# Patient Record
Sex: MALE | Race: WHITE | NOT HISPANIC OR LATINO | Employment: FULL TIME | URBAN - METROPOLITAN AREA
[De-identification: names, ages, dates, MRNs, and addresses within clinical notes are randomized per-mention and may not be internally consistent; named-entity substitution may affect disease eponyms.]

---

## 2019-09-11 ENCOUNTER — OFFICE VISIT (OUTPATIENT)
Dept: ENDOCRINOLOGY | Facility: CLINIC | Age: 54
End: 2019-09-11

## 2019-09-11 VITALS
HEART RATE: 78 BPM | WEIGHT: 269 LBS | HEIGHT: 72 IN | DIASTOLIC BLOOD PRESSURE: 80 MMHG | SYSTOLIC BLOOD PRESSURE: 136 MMHG | BODY MASS INDEX: 36.44 KG/M2

## 2019-09-11 DIAGNOSIS — R53.83 FATIGUE, UNSPECIFIED TYPE: ICD-10-CM

## 2019-09-11 DIAGNOSIS — E66.9 CLASS 2 OBESITY WITH BODY MASS INDEX (BMI) OF 36.0 TO 36.9 IN ADULT, UNSPECIFIED OBESITY TYPE, UNSPECIFIED WHETHER SERIOUS COMORBIDITY PRESENT: ICD-10-CM

## 2019-09-11 DIAGNOSIS — E11.65 TYPE 2 DIABETES MELLITUS WITH HYPERGLYCEMIA, WITHOUT LONG-TERM CURRENT USE OF INSULIN (HCC): Primary | ICD-10-CM

## 2019-09-11 DIAGNOSIS — N52.9 ERECTILE DYSFUNCTION, UNSPECIFIED ERECTILE DYSFUNCTION TYPE: ICD-10-CM

## 2019-09-11 DIAGNOSIS — E29.1 HYPOGONADISM IN MALE: ICD-10-CM

## 2019-09-11 PROBLEM — E66.812 CLASS 2 OBESITY WITH BODY MASS INDEX (BMI) OF 36.0 TO 36.9 IN ADULT: Status: ACTIVE | Noted: 2019-09-11

## 2019-09-11 PROCEDURE — 99245 OFF/OP CONSLTJ NEW/EST HI 55: CPT | Performed by: INTERNAL MEDICINE

## 2019-09-11 RX ORDER — DEXTROAMPHETAMINE SACCHARATE, AMPHETAMINE ASPARTATE, DEXTROAMPHETAMINE SULFATE AND AMPHETAMINE SULFATE 7.5; 7.5; 7.5; 7.5 MG/1; MG/1; MG/1; MG/1
30 TABLET ORAL 2 TIMES DAILY
COMMUNITY
End: 2019-09-24 | Stop reason: SDUPTHER

## 2019-09-11 RX ORDER — METOPROLOL SUCCINATE 100 MG/1
100 TABLET, EXTENDED RELEASE ORAL DAILY
Refills: 3 | COMMUNITY
Start: 2019-08-31

## 2019-09-11 RX ORDER — VENLAFAXINE 75 MG/1
75 TABLET ORAL DAILY
COMMUNITY

## 2019-09-11 NOTE — PROGRESS NOTES
Viviana Álvarez 48 y o  male MRN: [id number]    Encounter: [id number]  Referring Provider  [facility]  [address], [address] [place] [address]    Assessment/Plan     Assessment: This is a 48y o -year-old male with history of type 2 diabetes mellitus, obesity, ADHD    Plan:  1  Type 2 diabetes mellitus long-term insulin therapy  Poorly controlled based on recall of A1c, blood sugar log    Recommend the following at this time  Continue to tresiba 35 units subcutaneously once a day  Increase NovoLog to 12 units subcutaneously with meals 3 times a day  Start sliding scale insulin    - check blood sugars qAC and HS  - check A1c, CMP, lipid panel, urine microalbumin  - referred for diabetes education/medical nutrition therapy  Discussed action of long and short-acting insulin, relation to food    Patient has been given a list of long, short-acting insulins to inquire which would be most affordable per insurance  He will find out if he has used jardiance or Januvia in the past     - Recommended a consistent carbohydrate diet   - weight control and exercise as discussed ( ideal is 30 min, at least 5 times a week)   - home glucose monitoring and goals emphasized, requested patient bring in glucose monitor or log sheets to next visit   - discussed signs and symptoms of hypoglycemia and how to correct them appropriately  - counseled about the long term complications of uncontrolled diabetes, including, Nephropathy, Neuropathy, CVD, Retinopathy and importance  of adherence to diet, treatment plan and life style modifications   - importance of following up with Opthalmology and podiatry   - glycohemoglobin and other lab monitoring discussed, A1c goal value reviewed  - long term diabetic complications discussed    2     History of hypogonadism, testosterone therapy  Unclear if this was a confirmed diagnosis, Etiology unknown   -check a m  Total, free testosterone, LH, FSH, prolactin level, CBC, TSH    3  Obesity-diet and lifestyle as discussed, weight loss  4  Fatigue - investigations as above     CC: Diabetes    History of Present Illness     HPI:  Seth Henry is a 48 y o  male presents for a new visit regarding diabetes management  Also has a h/o ADHD, hypogonadism      DM history:   Diagnosed in 1972   No complications of CAD/ CVA/CKD  Last Eye exam:  No retinopathy;  Long time ago     Current regimen:   tresiba 35 units daily  novolog 10 units with meals - does not always take before the meal    Metformin - got a sore back   glimepiride - stopped for unknown reasons; no s/e  [place] or jardiance- " bad one"   ozempic - does not remember but some s/e     Has used Lisinopril, rosuvastatin in the past    Statin:  --   ACE-I/ARB: --     C/o shortness of breath, back ache and back pain   Patient feels that it is from the novolog  When he ran out of it and feels his symptoms are better  Gained weight- 19 lbs   In office A1c approx 6 weeks ago was 11 per patient   C/o dizziness, feels tired all the time   No vertigo but "feels off balance"   paraesthesias in the feet   Check BG multiple times a day   avg : 170-200  Lows approx 1 in 2 weeks   Gets shakes when low   No exercise; Not following a CC diet     Never had diabetes education   Also finding medications expensive     Also was taking testosterone cypionate 100 mg every 2 weeks [facility]  Did not take it regularly but felt better while he was on it  X 3 years   Stopped taking that 3 months ago   Had c/o low sex drive and feeling sleepy   At this time , low libido , C/o difficulty in maintaining erections   Tried viagra - got headaches   No investigations done for etiology   Rare headaches; vision has changed - blurriness that waxes and wanes   No h/o genital or head trauma   No narcotics   No galctorrhea/ Gynecomastia     All other systems were reviewed and are negative      Review of Systems      Historical Information   No past medical history on file  No past surgical history on file  Social History   Social History     Substance and Sexual Activity   Alcohol Use Not on file     Social History     Substance and Sexual Activity   Drug Use Not on file     Social History     Tobacco Use   Smoking Status Not on file     Family History: No family history on file  Meds/Allergies   Current Outpatient Medications   Medication Sig Dispense Refill    amphetamine-dextroamphetamine (ADDERALL) 30 MG tablet Take 30 mg by mouth 2 (two) times a day      Insulin Aspart (NOVOLOG FLEXPEN SC) Inject 10 Units under the skin 3 (three) times a day with meals      Insulin Degludec (TRESIBA) 100 UNIT/ML SOLN Inject 35 Units under the skin daily      metoprolol succinate (TOPROL-XL) 100 mg 24 hr tablet Take 100 mg by mouth 2 (two) times a day  3    venlafaxine (EFFEXOR) 75 mg tablet Take 75 mg by mouth daily       No current facility-administered medications for this visit  No Known Allergies    Objective   Vitals: Blood pressure 136/80, pulse 78, height 6' (1 829 m), weight 122 kg (269 lb)  Physical Exam   Constitutional: He is oriented to person, place, and time  He appears well-developed and well-nourished  No distress  HENT:   Head: Normocephalic and atraumatic  Eyes: Pupils are equal, round, and reactive to light  Conjunctivae are normal    Neck: Normal range of motion  Neck supple  No thyromegaly present  Cardiovascular: Normal rate, regular rhythm and normal heart sounds  No murmur heard  Pulmonary/Chest: Effort normal and breath sounds normal  No respiratory distress  He has no wheezes  Abdominal: Soft  He exhibits no distension  There is no tenderness  There is no guarding  Musculoskeletal: He exhibits no edema  Neurological: He is alert and oriented to person, place, and time  Skin: Skin is warm and dry  No rash noted  He is not diaphoretic  No erythema  Psychiatric: He has a normal mood and affect   His behavior is normal  Thought content normal    Vitals reviewed  The history was obtained from the review of the chart, patient  Lab Results:  No labs      Portions of the record may have been created with voice recognition software  Occasional wrong word or "sound a like" substitutions may have occurred due to the inherent limitations of voice recognition software  Read the chart carefully and recognize, using context, where substitutions have occurred

## 2019-09-11 NOTE — PATIENT INSTRUCTIONS
Please get labs done as ordered  Continue to tresiba 35 units subcutaneously once a day  Increase NovoLog to 12 units subcutaneously with meals 3 times a day  Please take before the meal   If you are not eating or not eating any carbohydrates to not take mealtime insulin  In addition, please use novolog insulin per the following sliding scale to correct high blood sugars:  BG  151-200: 1 unit  201-250: 2 units  251-300: 3 units  301-350: 4 units  > 350: 5 units  Do not correct bed time highs unless > 200 mg/dl     You are being referred for diabetes education     Follow up in 4-6 weeks    Please call and inquire from your insurance which of the following will be covered   1  Lantus, Levemir,toujeo, basaglar, tresiba  2   Novolog, humalog, apidra, admelog    Find out if you have taken [place] or jardiance in the past

## 2019-09-24 DIAGNOSIS — F90.9 ATTENTION DEFICIT HYPERACTIVITY DISORDER (ADHD), UNSPECIFIED ADHD TYPE: Primary | ICD-10-CM

## 2019-09-25 DIAGNOSIS — F90.9 ATTENTION DEFICIT HYPERACTIVITY DISORDER (ADHD), UNSPECIFIED ADHD TYPE: ICD-10-CM

## 2019-09-25 RX ORDER — DEXTROAMPHETAMINE SACCHARATE, AMPHETAMINE ASPARTATE, DEXTROAMPHETAMINE SULFATE AND AMPHETAMINE SULFATE 7.5; 7.5; 7.5; 7.5 MG/1; MG/1; MG/1; MG/1
30 TABLET ORAL 2 TIMES DAILY
Qty: 60 TABLET | Refills: 0 | Status: SHIPPED | OUTPATIENT
Start: 2019-09-25 | End: 2019-09-25 | Stop reason: SDUPTHER

## 2019-09-25 RX ORDER — DEXTROAMPHETAMINE SACCHARATE, AMPHETAMINE ASPARTATE, DEXTROAMPHETAMINE SULFATE AND AMPHETAMINE SULFATE 7.5; 7.5; 7.5; 7.5 MG/1; MG/1; MG/1; MG/1
30 TABLET ORAL 2 TIMES DAILY
Qty: 60 TABLET | Refills: 0 | Status: SHIPPED | OUTPATIENT
Start: 2019-09-25 | End: 2019-10-28 | Stop reason: SDUPTHER

## 2019-09-25 NOTE — TELEPHONE ENCOUNTER
I am not sure if the patient is aware that Elisharasheedmatthew Huynhon is no longer with this, he will need to find a new PCP and in the meantime I will give him this 1 refill at this time, but no further refills

## 2019-10-28 ENCOUNTER — TELEPHONE (OUTPATIENT)
Dept: NEPHROLOGY | Facility: CLINIC | Age: 54
End: 2019-10-28

## 2019-10-28 DIAGNOSIS — F90.9 ATTENTION DEFICIT HYPERACTIVITY DISORDER (ADHD), UNSPECIFIED ADHD TYPE: ICD-10-CM

## 2019-10-28 RX ORDER — DEXTROAMPHETAMINE SACCHARATE, AMPHETAMINE ASPARTATE, DEXTROAMPHETAMINE SULFATE AND AMPHETAMINE SULFATE 7.5; 7.5; 7.5; 7.5 MG/1; MG/1; MG/1; MG/1
30 TABLET ORAL 2 TIMES DAILY
Qty: 60 TABLET | Refills: 0 | Status: SHIPPED | OUTPATIENT
Start: 2019-10-28 | End: 2019-10-28

## 2019-10-28 RX ORDER — DEXTROAMPHETAMINE SACCHARATE, AMPHETAMINE ASPARTATE MONOHYDRATE, DEXTROAMPHETAMINE SULFATE AND AMPHETAMINE SULFATE 7.5; 7.5; 7.5; 7.5 MG/1; MG/1; MG/1; MG/1
30 CAPSULE, EXTENDED RELEASE ORAL EVERY MORNING
Qty: 60 CAPSULE | Refills: 0 | Status: SHIPPED | OUTPATIENT
Start: 2019-10-28 | End: 2019-10-30 | Stop reason: SDUPTHER

## 2019-10-28 NOTE — TELEPHONE ENCOUNTER
Denisa Clark needs a refill on his Adderall 30mg, take 1 tablet by mouth two times daily  It needs to be the extended release, the last script that was written in September was not the extended release  Please print as soon as possible

## 2019-10-29 ENCOUNTER — TELEPHONE (OUTPATIENT)
Dept: NEPHROLOGY | Facility: CLINIC | Age: 54
End: 2019-10-29

## 2019-10-29 NOTE — TELEPHONE ENCOUNTER
Patient called he states that he picked up his script for adderall yesterday and it was written as once a day and it should have been twice daily  He was given a two week supply  So he only needs a 2 week supply sent to the pharmacy

## 2019-10-30 DIAGNOSIS — F90.9 ATTENTION DEFICIT HYPERACTIVITY DISORDER (ADHD), UNSPECIFIED ADHD TYPE: ICD-10-CM

## 2019-10-30 RX ORDER — DEXTROAMPHETAMINE SACCHARATE, AMPHETAMINE ASPARTATE MONOHYDRATE, DEXTROAMPHETAMINE SULFATE AND AMPHETAMINE SULFATE 7.5; 7.5; 7.5; 7.5 MG/1; MG/1; MG/1; MG/1
30 CAPSULE, EXTENDED RELEASE ORAL EVERY MORNING
Qty: 60 CAPSULE | Refills: 0 | Status: SHIPPED | OUTPATIENT
Start: 2019-10-30 | End: 2019-11-01 | Stop reason: SDUPTHER

## 2019-11-01 DIAGNOSIS — F90.9 ATTENTION DEFICIT HYPERACTIVITY DISORDER (ADHD), UNSPECIFIED ADHD TYPE: ICD-10-CM

## 2019-11-01 RX ORDER — DEXTROAMPHETAMINE SACCHARATE, AMPHETAMINE ASPARTATE MONOHYDRATE, DEXTROAMPHETAMINE SULFATE AND AMPHETAMINE SULFATE 7.5; 7.5; 7.5; 7.5 MG/1; MG/1; MG/1; MG/1
30 CAPSULE, EXTENDED RELEASE ORAL 2 TIMES DAILY
Qty: 60 CAPSULE | Refills: 0 | Status: SHIPPED | OUTPATIENT
Start: 2019-11-01 | End: 2019-12-17 | Stop reason: SDUPTHER

## 2019-12-17 ENCOUNTER — OFFICE VISIT (OUTPATIENT)
Dept: URGENT CARE | Facility: CLINIC | Age: 54
End: 2019-12-17
Payer: COMMERCIAL

## 2019-12-17 ENCOUNTER — HOSPITAL ENCOUNTER (EMERGENCY)
Facility: HOSPITAL | Age: 54
Discharge: HOME | End: 2019-12-17
Attending: EMERGENCY MEDICINE
Payer: COMMERCIAL

## 2019-12-17 VITALS
RESPIRATION RATE: 18 BRPM | SYSTOLIC BLOOD PRESSURE: 167 MMHG | TEMPERATURE: 98 F | HEART RATE: 86 BPM | OXYGEN SATURATION: 97 % | WEIGHT: 270 LBS | DIASTOLIC BLOOD PRESSURE: 100 MMHG | BODY MASS INDEX: 36.62 KG/M2

## 2019-12-17 VITALS
DIASTOLIC BLOOD PRESSURE: 105 MMHG | RESPIRATION RATE: 17 BRPM | OXYGEN SATURATION: 98 % | SYSTOLIC BLOOD PRESSURE: 168 MMHG | HEART RATE: 86 BPM | WEIGHT: 270 LBS | TEMPERATURE: 97.8 F | BODY MASS INDEX: 36.62 KG/M2

## 2019-12-17 DIAGNOSIS — S01.81XA FACIAL LACERATION, INITIAL ENCOUNTER: Primary | ICD-10-CM

## 2019-12-17 PROCEDURE — 90471 IMMUNIZATION ADMIN: CPT | Performed by: PHYSICIAN ASSISTANT

## 2019-12-17 PROCEDURE — 90715 TDAP VACCINE 7 YRS/> IM: CPT

## 2019-12-17 PROCEDURE — 12052 INTMD RPR FACE/MM 2.6-5.0 CM: CPT | Performed by: EMERGENCY MEDICINE

## 2019-12-17 PROCEDURE — 99283 EMERGENCY DEPT VISIT LOW MDM: CPT

## 2019-12-17 PROCEDURE — G0382 LEV 3 HOSP TYPE B ED VISIT: HCPCS | Performed by: PHYSICIAN ASSISTANT

## 2019-12-17 PROCEDURE — 99282 EMERGENCY DEPT VISIT SF MDM: CPT | Performed by: EMERGENCY MEDICINE

## 2019-12-17 RX ORDER — LIDOCAINE HYDROCHLORIDE AND EPINEPHRINE 10; 10 MG/ML; UG/ML
5 INJECTION, SOLUTION INFILTRATION; PERINEURAL ONCE
Status: COMPLETED | OUTPATIENT
Start: 2019-12-17 | End: 2019-12-17

## 2019-12-17 RX ORDER — LIDOCAINE HYDROCHLORIDE 10 MG/ML
5 INJECTION, SOLUTION EPIDURAL; INFILTRATION; INTRACAUDAL; PERINEURAL ONCE
Status: COMPLETED | OUTPATIENT
Start: 2019-12-17 | End: 2019-12-17

## 2019-12-17 RX ORDER — INSULIN LISPRO 100 [IU]/ML
INJECTION, SOLUTION INTRAVENOUS; SUBCUTANEOUS
COMMUNITY
Start: 2019-12-09

## 2019-12-17 RX ADMIN — LIDOCAINE HYDROCHLORIDE AND EPINEPHRINE 5 ML: 10; 10 INJECTION, SOLUTION INFILTRATION; PERINEURAL at 17:23

## 2019-12-17 RX ADMIN — LIDOCAINE HYDROCHLORIDE 5 ML: 10 INJECTION, SOLUTION EPIDURAL; INFILTRATION; INTRACAUDAL; PERINEURAL at 17:23

## 2019-12-17 NOTE — PATIENT INSTRUCTIONS
Cannot completely evaluate laceration and see how deep it is  Recommend patient go to the emergency room for repair of facial laceration  He agrees and will be going to [facility]  Tetanus was given here

## 2019-12-17 NOTE — PROGRESS NOTES
[facility]    NAME: Vinny Guidry is a 48 y o  male  : 1965    MRN: [id number]  DATE: 2019  TIME: 4:11 PM    Assessment and Plan   Facial laceration, initial encounter Geeta Graves  Facial laceration, initial encounter  TDAP Vaccine greater than or equal to 8yo    Transfer to other facility       Patient Instructions     Patient Instructions   Cannot completely evaluate laceration and see how deep it is  Recommend patient go to the emergency room for repair of facial laceration  He agrees and will be going to [facility]  Tetanus was given here  Chief Complaint     Chief Complaint   Patient presents with    Laceration     face       History of Present Illness   57-year-old male here with laceration to the left side of his face  Patient states that he was using a  to cut off a lock and the  kicked back and caught him in the left cheek  Injury occurred about 2 hours ago  Review of Systems   Review of Systems   Constitutional: Negative for chills and fever  HENT: Negative for congestion  Respiratory: Negative for cough and shortness of breath  Cardiovascular: Negative for chest pain  Skin: Positive for wound         Current Medications     Current Outpatient Medications:     HUMALOG KWIKPEN 100 units/mL injection pen, , Disp: , Rfl:     Insulin Aspart (NOVOLOG FLEXPEN SC), Inject 10 Units under the skin 3 (three) times a day with meals, Disp: , Rfl:     Insulin Degludec (TRESIBA) 100 UNIT/ML SOLN, Inject 35 Units under the skin daily, Disp: , Rfl:     metoprolol succinate (TOPROL-XL) 100 mg 24 hr tablet, Take 100 mg by mouth 2 (two) times a day, Disp: , Rfl: 3    venlafaxine (EFFEXOR) 75 mg tablet, Take 75 mg by mouth daily, Disp: , Rfl:     Current Allergies     Allergies as of 2019    (No Known Allergies)          The following portions of the patient's history were reviewed and updated as appropriate: allergies, current medications, past family history, past medical history, past social history, past surgical history and problem list    Past Medical History:   Diagnosis Date    Diabetes mellitus ([facility] [address] )     Hypertension      History reviewed  No pertinent surgical history  History reviewed  No pertinent family history  Social History     Socioeconomic History    Marital status: Single     Spouse name: Not on file    Number of children: Not on file    Years of education: Not on file    Highest education level: Not on file   Occupational History    Not on file   Social Needs    Financial resource strain: Not on file    Food insecurity:     Worry: Not on file     Inability: Not on file    Transportation needs:     Medical: Not on file     Non-medical: Not on file   Tobacco Use    Smoking status: Not on file   Substance and Sexual Activity    Alcohol use: Not on file    Drug use: Not on file    Sexual activity: Not on file   Lifestyle    Physical activity:     Days per week: Not on file     Minutes per session: Not on file    Stress: Not on file   Relationships    Social connections:     Talks on phone: Not on file     Gets together: Not on file     Attends Sikhism service: Not on file     Active member of club or organization: Not on file     Attends meetings of clubs or organizations: Not on file     Relationship status: Not on file    Intimate partner violence:     Fear of current or ex partner: Not on file     Emotionally abused: Not on file     Physically abused: Not on file     Forced sexual activity: Not on file   Other Topics Concern    Not on file   Social History Narrative    Not on file     Medications have been verified  Objective   /100   Pulse 86   Temp 98 °F (36 7 °C)   Resp 18   Wt 122 kg (270 lb)   SpO2 97%   BMI 36 62 kg/m²      Physical Exam   Physical Exam   Constitutional: He appears well-developed and well-nourished  No distress     HENT:   Head:       Cardiovascular: Normal rate, regular rhythm, normal heart sounds and intact distal pulses  Pulmonary/Chest: Effort normal and breath sounds normal  No respiratory distress  Nursing note and vitals reviewed

## 2019-12-17 NOTE — ED PROVIDER NOTES
History  Chief Complaint   Patient presents with    Facial Laceration     Patient states  flew out of his hand and cut his face about 2 hours ago  68-year-old male with past medical history of diabetes mellitus on insulin and hypertension who is presenting with a facial laceration  Patient reports that he was using a  in order to open a lock  The  then "flew back" and hit the patient in the face  Patient denies losing consciousness  He noted blood coming from his face so he went to an urgent care  The urgent care referred him to the ER for repair of his laceration  At this time, the patient has minimal pain  He denies any headache, vision changes, numbness, difficulty speaking, or any other complaints at this time  He is able to bite down without difficulty  Patient is up-to-date with tetanus  His last immunization was approximately 1 year ago  Prior to Admission Medications   Prescriptions Last Dose Informant Patient Reported? Taking? HUMALOG KWIKPEN 100 units/mL injection pen   Yes No   Insulin Aspart (NOVOLOG FLEXPEN SC)   Yes No   Sig: Inject 10 Units under the skin 3 (three) times a day with meals   Insulin Degludec (TRESIBA) 100 UNIT/ML SOLN   Yes No   Sig: Inject 35 Units under the skin daily   metoprolol succinate (TOPROL-XL) 100 mg 24 hr tablet   Yes No   Sig: Take 100 mg by mouth 2 (two) times a day   venlafaxine (EFFEXOR) 75 mg tablet   Yes No   Sig: Take 75 mg by mouth daily      Facility-Administered Medications: None       Past Medical History:   Diagnosis Date    Diabetes mellitus ([facility] [address] )     Hypertension        History reviewed  No pertinent surgical history  History reviewed  No pertinent family history  I have reviewed and agree with the history as documented  Social History     Tobacco Use    Smoking status: Never Smoker    Smokeless tobacco: Current User     Types: Chew   Substance Use Topics    Alcohol use:  Yes    Drug use: Never Review of Systems   Constitutional: Negative for diaphoresis, fever and unexpected weight change  HENT: Negative for congestion, rhinorrhea and sore throat  Eyes: Negative for pain, discharge and visual disturbance  Respiratory: Negative for cough, shortness of breath and wheezing  Cardiovascular: Negative for chest pain, palpitations and leg swelling  Gastrointestinal: Negative for abdominal pain, blood in stool, constipation, diarrhea, nausea and vomiting  Genitourinary: Negative for dysuria, flank pain and hematuria  Musculoskeletal: Negative for arthralgias and myalgias  Skin: Positive for wound  Negative for rash  Allergic/Immunologic: Negative for environmental allergies and food allergies  Neurological: Negative for dizziness, seizures, weakness and numbness  Hematological: Negative for adenopathy  Psychiatric/Behavioral: Negative for confusion and hallucinations  Physical Exam  ED Triage Vitals [12/17/19 1702]   Temperature Pulse Respirations Blood Pressure SpO2   97 8 °F (36 6 °C) 86 17 (!) 168/105 98 %      Temp Source Heart Rate Source Patient Position - Orthostatic VS BP Location FiO2 (%)   Oral Monitor Sitting Left arm --      Pain Score       1             Orthostatic Vital Signs  Vitals:    12/17/19 1702   BP: (!) 168/105   Pulse: 86   Patient Position - Orthostatic VS: Sitting       Physical Exam   Constitutional: He is oriented to person, place, and time  He appears well-developed and well-nourished  HENT:   Head: Normocephalic and atraumatic  Right Ear: External ear normal    Left Ear: External ear normal    Nose: Nose normal    Patient is able to bite down without difficulty  Occlusion appears normal   Patient has a dental prosthesis of the mandibular incisors  Eyes: Pupils are equal, round, and reactive to light  EOM are normal    Neck: Normal range of motion  Neck supple  Cardiovascular: Normal rate, regular rhythm and normal heart sounds     No murmur heard   Pulmonary/Chest: Effort normal and breath sounds normal  No respiratory distress  He has no wheezes  He has no rales  Abdominal: Soft  Bowel sounds are normal  He exhibits no distension  There is no tenderness  There is no guarding  Musculoskeletal: Normal range of motion  He exhibits no deformity  Neurological: He is alert and oriented to person, place, and time  Skin: Skin is warm and dry  Capillary refill takes less than 2 seconds  He is not diaphoretic  There is a 4 cm laceration located just inferior and lateral to the left lower lip  Bleeding is controlled  Laceration extends into the subcutaneous layer but does not extend through the oral mucosa  Psychiatric: He has a normal mood and affect  His behavior is normal    Nursing note and vitals reviewed  ED Medications  Medications   lidocaine-epinephrine (XYLOCAINE/EPINEPHRINE) 1 %-1:100,000 injection 5 mL (5 mL Infiltration Given by Other 12/17/19 1723)   lidocaine (PF) (XYLOCAINE-MPF) 1 % injection 5 mL (5 mL Infiltration Given by Other 12/17/19 1723)       Diagnostic Studies  Results Reviewed     None                 No orders to display         Procedures  Nerve block  Date/Time: 12/17/2019 6:05 PM  Performed by: Prince Griffiths MD  Authorized by: Prince Griffiths MD     Patient location:  ED  Other Assisting Provider: No    Consent:     Consent obtained:  Verbal    Consent given by:  Patient    Risks discussed:  Pain, bleeding, infection, intravenous injection, nerve damage and unsuccessful block    Alternatives discussed:  Alternative treatment  Universal protocol:     Procedure explained and questions answered to patient or proxy's satisfaction: yes      Time out called: yes      Patient identity confirmed:  Verbally with patient and arm band  Indications:     Indications:  Procedural anesthesia  Location:     Body area:  Head    Head nerve blocked: Mental nerve      Nerve type:  Peripheral    Laterality: Left  Procedure details (see MAR for exact dosages): Block needle gauge:  27 G    Anesthetic injected:  Lidocaine 1% w/o epi  Post-procedure details:     Dressing:  None    Outcome:  Anesthesia achieved    Patient tolerance of procedure: Tolerated well, no immediate complications    Laceration repair  Date/Time: 12/17/2019 6:53 PM  Performed by: Lary Lindquist MD  Authorized by: Lary Lindquist MD   Consent: Verbal consent obtained  Risks and benefits: risks, benefits and alternatives were discussed  Consent given by: patient  Patient understanding: patient states understanding of the procedure being performed  Patient consent: the patient's understanding of the procedure matches consent given  Site marked: the operative site was marked  Patient identity confirmed: verbally with patient and arm band  Time out: Immediately prior to procedure a "time out" was called to verify the correct patient, procedure, equipment, support staff and site/side marked as required  Body area: head/neck  Location details: left cheek  Laceration length: 4 cm  Foreign bodies: no foreign bodies  Tendon involvement: none  Nerve involvement: none  Vascular damage: no  Anesthesia: local infiltration and nerve block    Anesthesia:  Local Anesthetic: lidocaine 1% with epinephrine  Anesthetic total: 4 mL      Procedure Details:  Preparation: Patient was prepped and draped in the usual sterile fashion  Irrigation solution: saline  Irrigation method: syringe  Amount of cleaning: extensive  Skin closure: 4-0 Prolene  Wound subcutaneous closure material used: 4-0 Monocryl    Number of sutures: 14  Technique: complex  Approximation: close  Approximation difficulty: complex  Patient tolerance: Patient tolerated the procedure well with no immediate complications                 ED Course                               MDM  Number of Diagnoses or Management Options  Facial laceration, initial encounter: new and does not require workup  Diagnosis management comments:     Patient presented with facial laceration as described above  He had no other injuries  Tetanus was up-to-date  Laceration was thoroughly irrigated after mental nerve block and local infiltration  The laceration was repaired as described above  Patient tolerated procedure well  Wound care instructions were given to the patient  Return precautions were also discussed  He verbalized understanding  Amount and/or Complexity of Data Reviewed  Decide to obtain previous medical records or to obtain history from someone other than the patient: yes  Review and summarize past medical records: yes    Risk of Complications, Morbidity, and/or Mortality  Presenting problems: low  Diagnostic procedures: minimal  Management options: minimal    Patient Progress  Patient progress: improved        Disposition  Final diagnoses:   Facial laceration, initial encounter     Time reflects when diagnosis was documented in both MDM as applicable and the Disposition within this note     Time User Action Codes Description Comment    12/17/2019  6:54 PM Alessandra Craig Add [[id number]] Facial laceration, initial encounter       ED Disposition     ED Disposition Condition Date/Time Comment    Discharge Good e Dec 17, 2019  6:54 PM Kendy Proctor discharge to home/self care  Follow-up Information     Follow up With Specialties Details Why Contact Info Additional [address] Emergency Department Emergency Medicine Go to  If symptoms worsen, For suture removal in 5 days   [address] [place] [address], [address] [place] [address], [id number] [phone]          Discharge Medication List as of 12/17/2019  6:56 PM      CONTINUE these medications which have NOT CHANGED    Details   HUMALOG KWIKPEN 100 units/mL injection pen Starting Mon 12/9/2019, Historical Med      Insulin Aspart (NOVOLOG FLEXPEN SC) Inject 10 Units under the skin 3 (three) times a day with meals, Historical Med      Insulin Degludec (TRESIBA) 100 UNIT/ML SOLN Inject 35 Units under the skin daily, Historical Med      metoprolol succinate (TOPROL-XL) 100 mg 24 hr tablet Take 100 mg by mouth 2 (two) times a day, Starting Sat 8/31/2019, Historical Med      venlafaxine (EFFEXOR) 75 mg tablet Take 75 mg by mouth daily, Historical Med           No discharge procedures on file  ED Provider  Attending physically available and evaluated Hanny Cedillo I managed the patient along with the ED Attending      Electronically Signed by         Solitario Moyer MD  12/17/19 1946

## 2019-12-17 NOTE — ED ATTENDING ATTESTATION
12/17/2019  IZeny MD, saw and evaluated the patient  I have discussed the patient with the resident/non-physician practitioner and agree with the resident's/non-physician practitioner's findings, Plan of Care, and MDM as documented in the resident's/non-physician practitioner's note, except where noted  All available labs and Radiology studies were reviewed  I was present for key portions of any procedure(s) performed by the resident/non-physician practitioner and I was immediately available to provide assistance  At this point I agree with the current assessment done in the Emergency Department  I have conducted an independent evaluation of this patient a history and physical is as follows:      Patient presents referred from urgent care with approximate 4 in laceration to the subcutaneous tissue on the inferior lateral side of left face  No intraoral involvement  Patient states he cut it with an angle  at home angle  kicked back and hit him in the face he had immediate bleeding  Patient denies any additional injuries  On physical exam patient has no facial nerve involvement with smile or grimace  Impression:  Complicated facial laceration  Will proceed with primary closure using sutures    Wound care instructions given to patient     ED Course         Critical Care Time  Procedures

## 2019-12-17 NOTE — DISCHARGE INSTRUCTIONS
You may shower tonight  Pat the area dry  Avoid swimming in pools, hot tubs, or lakes  You will need to have the sutures removed in 5 days  This can be done at an ER or urgent care  Your PCP may also have suture removal supplies  Return to the ER or seek treatment if you have fever, chills, increasing redness around the wound, pus coming from the wound, or if the wound comes apart

## 2021-12-27 ENCOUNTER — APPOINTMENT (OUTPATIENT)
Dept: RADIOLOGY | Facility: CLINIC | Age: 56
End: 2021-12-27
Payer: COMMERCIAL

## 2021-12-27 ENCOUNTER — OFFICE VISIT (OUTPATIENT)
Dept: URGENT CARE | Facility: CLINIC | Age: 56
End: 2021-12-27
Payer: COMMERCIAL

## 2021-12-27 VITALS
DIASTOLIC BLOOD PRESSURE: 84 MMHG | HEIGHT: 72 IN | RESPIRATION RATE: 18 BRPM | HEART RATE: 68 BPM | OXYGEN SATURATION: 95 % | SYSTOLIC BLOOD PRESSURE: 130 MMHG | TEMPERATURE: 96.9 F | WEIGHT: 275 LBS | BODY MASS INDEX: 37.25 KG/M2

## 2021-12-27 DIAGNOSIS — R05.9 COUGH: ICD-10-CM

## 2021-12-27 DIAGNOSIS — R06.02 SOB (SHORTNESS OF BREATH): ICD-10-CM

## 2021-12-27 DIAGNOSIS — J06.9 UPPER RESPIRATORY INFECTION WITH COUGH AND CONGESTION: Primary | ICD-10-CM

## 2021-12-27 PROCEDURE — 99213 OFFICE O/P EST LOW 20 MIN: CPT | Performed by: NURSE PRACTITIONER

## 2021-12-27 PROCEDURE — 87636 SARSCOV2 & INF A&B AMP PRB: CPT | Performed by: NURSE PRACTITIONER

## 2021-12-27 PROCEDURE — 71046 X-RAY EXAM CHEST 2 VIEWS: CPT

## 2021-12-27 RX ORDER — ROSUVASTATIN CALCIUM 5 MG/1
5 TABLET, COATED ORAL DAILY
COMMUNITY

## 2021-12-27 RX ORDER — DEXTROAMPHETAMINE SACCHARATE, AMPHETAMINE ASPARTATE, DEXTROAMPHETAMINE SULFATE AND AMPHETAMINE SULFATE 7.5; 7.5; 7.5; 7.5 MG/1; MG/1; MG/1; MG/1
30 TABLET ORAL 2 TIMES DAILY
COMMUNITY

## 2021-12-29 LAB
FLUAV RNA RESP QL NAA+PROBE: POSITIVE
FLUBV RNA RESP QL NAA+PROBE: NEGATIVE
SARS-COV-2 RNA RESP QL NAA+PROBE: POSITIVE

## 2022-01-05 ENCOUNTER — APPOINTMENT (OUTPATIENT)
Dept: RADIOLOGY | Facility: CLINIC | Age: 57
End: 2022-01-05

## 2022-01-05 DIAGNOSIS — R05.9 COUGH: ICD-10-CM

## 2022-01-05 PROCEDURE — 71046 X-RAY EXAM CHEST 2 VIEWS: CPT

## 2022-05-25 ENCOUNTER — OFFICE VISIT (OUTPATIENT)
Dept: ENDOCRINOLOGY | Facility: CLINIC | Age: 57
End: 2022-05-25

## 2022-05-25 VITALS
HEIGHT: 72 IN | WEIGHT: 259.8 LBS | HEART RATE: 70 BPM | DIASTOLIC BLOOD PRESSURE: 75 MMHG | BODY MASS INDEX: 35.19 KG/M2 | SYSTOLIC BLOOD PRESSURE: 130 MMHG

## 2022-05-25 DIAGNOSIS — E11.65 TYPE 2 DIABETES MELLITUS WITH HYPERGLYCEMIA, WITHOUT LONG-TERM CURRENT USE OF INSULIN (HCC): Primary | ICD-10-CM

## 2022-05-25 DIAGNOSIS — Z79.4 CURRENT USE OF INSULIN (HCC): ICD-10-CM

## 2022-05-25 PROCEDURE — 99214 OFFICE O/P EST MOD 30 MIN: CPT | Performed by: INTERNAL MEDICINE

## 2022-05-25 RX ORDER — FLASH GLUCOSE SENSOR
KIT MISCELLANEOUS
Qty: 1 EACH | Refills: 5 | Status: SHIPPED | OUTPATIENT
Start: 2022-05-25

## 2022-05-25 RX ORDER — INSULIN LISPRO 100 [IU]/ML
INJECTION, SOLUTION INTRAVENOUS; SUBCUTANEOUS
Qty: 30 ML | Refills: 4 | Status: SHIPPED | OUTPATIENT
Start: 2022-05-25

## 2022-05-25 RX ORDER — INSULIN GLARGINE 100 [IU]/ML
INJECTION, SOLUTION SUBCUTANEOUS
Qty: 20 ML | Refills: 4 | Status: SHIPPED | OUTPATIENT
Start: 2022-05-25

## 2022-05-25 NOTE — PATIENT INSTRUCTIONS
Please send in blood sugars in 2 weeks  If you get the [place], please call the office to connect to the [facility] can use the Lantus vials 60units once daily, and use the Humalog at the carb counting and correction factor    If you need help with drawing up the insulin syringe, and would like someone to show you how to do it, please let us know       Please send in the labs that you have

## 2022-05-25 NOTE — PROGRESS NOTES
New Patient Progress Note      Chief Complaint   Patient presents with    Diabetes Type 2      Referring Provider  Srikanth Quintanilla Md  [address],  [address]     History of Present Illness:   Amisha Dyson is a 56 y o  male with a history of type 2 diabetes with long term use of insulin  Last seen by Dr Ricky Lou in 2019  Her note is reviewed  Of note, he has no insurance  Formerly, he saw Arnie Deleon, for diabetes care  He states her office is no longer open in his area  He states he felt "horrible" with insulin use including headaches, blurry vision  However, with out insulin he "just wants to sleep" and has poor energy  He had an unintentional 25# weight loss and has noted significant nocturia  Diabetes was diagnosed in 2017  He reports foot/toe numbness, but denies known renal or retinopathy  No complications of MI or CVA  No known hospitalization for DKA or HHS  Last Eye exam:  No retinopathy;  Long time ago   Pod: reports symptoms suggestive of Neuropathy     Current regimen: not used in 3-4mos  tresiba 60 units daily  novolog or Humalog 20 units with meals  He states he uses a carb ratio and correction, 1unit for about 10g of carbs and a correction factor       Prior medications included:  Metformin - got a sore back   glimepiride - stopped for unknown reasons  Januvia or Jardiance- " bad one"   ozempic - some s/e      Has used Lisinopril, rosuvastatin in the past  Does not have BGs  When checks, most are high  Office A1c is too high to calculate    Hypoglycemia denies  DM education: states he knows what to do and what he needs  Reports having some carb counting  Activity: 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, 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          Reports a history of low testosterone with past use of testosterone cypionate 100 mg every 2 weeks (while he was in [place])   Took this for about 3 years, but last took this in 2019   He reports low libido, difficulty in maintaining erections  Saw Urology in dec 2021  Patient Active Problem List   Diagnosis    Type 2 diabetes mellitus with hyperglycemia, without long-term current use of insulin ([facility] )    Hypogonadism in male   Jack Drop Erectile dysfunction    Fatigue    Class 2 obesity with body mass index (BMI) of 36 0 to 36 9 in adult    Current use of insulin (HCC)      Past Medical History:   Diagnosis Date    Diabetes mellitus ([facility] )     Hyperlipemia     Hypertension       History reviewed  No pertinent surgical history  Family History   Problem Relation Age of Onset    Heart failure Mother     Dementia Father      Social History     Tobacco Use    Smoking status: Never Smoker    Smokeless tobacco: Current User     Types: Chew   Substance Use Topics    Alcohol use:  Yes     Allergies   Allergen Reactions    Metformin Diarrhea         Current Outpatient Medications:     amphetamine-dextroamphetamine (ADDERALL) 30 MG tablet, Take 30 mg by mouth 2 (two) times a day, Disp: , Rfl:     Continuous Blood Gluc Sensor (FreeStyle Diomedes 14 Day Sensor) MISC, Use and change every 2 weeks, Disp: 1 each, Rfl: 5    insulin glargine (Lantus) 100 units/mL subcutaneous injection, Inject 60units subcut once daily at 11pm, Disp: 20 mL, Rfl: 4    insulin lispro (HumaLOG KwikPen) 100 units/mL injection pen, Use up to 20units with meals 3x daily based on carb counting and correction, Disp: 30 mL, Rfl: 4    Insulin Syringe-Needle U-100 30G X 5/16" 1 ML MISC, Use daily with glargine vials, Disp: 90 each, Rfl: 1    metoprolol succinate (TOPROL-XL) 100 mg 24 hr tablet, Take 100 mg by mouth daily, Disp: , Rfl: 3    rosuvastatin (CRESTOR) 5 mg tablet, Take 5 mg by mouth daily, Disp: , Rfl:     venlafaxine (EFFEXOR) 75 mg tablet, Take 75 mg by mouth daily, Disp: , Rfl:     HUMALOG KWIKPEN 100 units/mL injection pen, , Disp: , Rfl:     Insulin Aspart (NOVOLOG FLEXPEN SC), Inject 10 Units under the skin 3 (three) times a day with meals (Patient not taking: Reported on 5/25/2022), Disp: , Rfl:     Insulin Degludec 100 UNIT/ML SOLN, Inject 65 Units under the skin daily   (Patient not taking: Reported on 5/25/2022), Disp: , Rfl:   Review of Systems   Constitutional: Positive for unexpected weight change (about 25#)  HENT: Negative for hearing loss and voice change  Eyes: Negative for visual disturbance  Respiratory: Positive for shortness of breath  Negative for wheezing  Gastrointestinal: Negative for constipation, diarrhea, nausea and vomiting  Endocrine: Positive for polydipsia and polyuria  Genitourinary:        Multiple episodes of nocturia   Musculoskeletal: Positive for back pain and myalgias  Neurological: Positive for numbness  Psychiatric/Behavioral: The patient is not nervous/anxious  Physical Exam:  Body mass index is 35 24 kg/m²  /75   Pulse 70   Ht 6' (1 829 m)   Wt 118 kg (259 lb 12 8 oz)   BMI 35 24 kg/m²    Wt Readings from Last 3 Encounters:   05/25/22 118 kg (259 lb 12 8 oz)   12/27/21 125 kg (275 lb)   12/17/19 122 kg (270 lb)       GEN: NAD  E/n/m nl facies, hearing intact bilat, tongue midline, lips nl  Eyes: no stare or proptosis, nl lids and conjunctiva, EOMI  Neck: trachea midline, thyroid NT to palpation, nl in size, no nodules or neck masses noted, no cervical LAD  CV; heart reg rate s1s2 nl, no m/r/g  Resp: CTAB, good effort, no accessory mm use, CTAB  Ab+BS  Neuro: no tremor,   MS: no c/c in digits, moves all 4 ext, nl muscle bulk, gait nl  Skin: warm and dry, no palmar erythema  Psych: nl mood and affect, no gross lapses in memory    Patient's shoes and socks removed  Right Foot/Ankle   Right Foot Inspection  Skin Exam: skin normal, skin intact, callus and callus  No dry skin, no warmth, no erythema, no maceration, no abnormal color, no pre-ulcer and no ulcer  Toe Exam: ROM and strength within normal limits       Sensory   Vibration: diminished  Monofilament testing: intact    Vascular  Capillary refills: < 3 seconds  The right DP pulse is 2+  Left Foot/Ankle  Left Foot Inspection  Skin Exam: skin normal and skin intact  No dry skin, no warmth, no erythema, no maceration, normal color, no pre-ulcer, no ulcer and no callus  Toe Exam: ROM and strength within normal limits  Sensory   Vibration: diminished  Monofilament testing: intact    Vascular  Capillary refills: < 3 seconds  The left DP pulse is 2+  Assign Risk Category  No deformity present  No loss of protective sensation  No weak pulses  Risk: 0      Labs: Impression:  1  Type 2 diabetes mellitus with hyperglycemia, without long-term current use of insulin ([facility] [address] )    2  Current use of insulin ([facility] )           Plan:    Alessandro Hartley was seen today for diabetes type 2  Diagnoses and all orders for this visit:    Type 2 diabetes mellitus with hyperglycemia, without long-term current use of insulin ([facility])  -     Cancel: POCT hemoglobin A1c  -     Continuous Blood Gluc Sensor (FreeStyle Diomedes 14 Day Sensor) MISC; Use and change every 2 weeks  -     insulin glargine (Lantus) 100 units/mL subcutaneous injection; Inject 60units subcut once daily at 11pm  -     insulin lispro (HumaLOG KwikPen) 100 units/mL injection pen; Use up to 20units with meals 3x daily based on carb counting and correction  -     Insulin Syringe-Needle U-100 30G X 5/16" 1 ML MISC; Use daily with glargine vials    Current use of insulin ([facility])  -     Continuous Blood Gluc Sensor (FreeStyle Diomedes 14 Day Sensor) MISC; Use and change every 2 weeks  -     insulin glargine (Lantus) 100 units/mL subcutaneous injection; Inject 60units subcut once daily at 11pm  -     insulin lispro (HumaLOG KwikPen) 100 units/mL injection pen; Use up to 20units with meals 3x daily based on carb counting and correction  -     Insulin Syringe-Needle U-100 30G X 5/16" 1 ML MISC; Use daily with glargine vials         1   T2DM, uncontrolled, complicated by neuropathy: A1c in the office is markedly elevated  I asked that he get labs for our records so we can select what to order to be most cost effective  Resumed old regimen  Suggested using vials for basal insulin and pens for meal insulin for cost  Will send in lantus vials 60units once daily and humalog pens to be used with his carb counting  He states he injected himself IM in the past and knows how to use syringes  Asked that he send in the actual scale and carb counting instructions for our records  Suggested pricing the cost of Diomedes sensors at [facility] as he used this in the past  Advised eye exam    Discussed with the patient and all questioned fully answered  He will call me if any problems arise      Counseled patient on diagnostic results, prognosis, risk and benefit of treatment options, instruction for management, importance of treatment compliance, Risk  factor reduction and impressions      Siva Christian MD None known

## 2023-03-18 ENCOUNTER — APPOINTMENT (EMERGENCY)
Dept: ULTRASOUND IMAGING | Facility: HOSPITAL | Age: 58
End: 2023-03-18

## 2023-03-18 ENCOUNTER — APPOINTMENT (EMERGENCY)
Dept: CT IMAGING | Facility: HOSPITAL | Age: 58
End: 2023-03-18

## 2023-03-18 ENCOUNTER — APPOINTMENT (EMERGENCY)
Dept: RADIOLOGY | Facility: HOSPITAL | Age: 58
End: 2023-03-18

## 2023-03-18 ENCOUNTER — HOSPITAL ENCOUNTER (EMERGENCY)
Facility: HOSPITAL | Age: 58
Discharge: HOME | End: 2023-03-18
Attending: FAMILY MEDICINE

## 2023-03-18 VITALS
RESPIRATION RATE: 17 BRPM | DIASTOLIC BLOOD PRESSURE: 80 MMHG | HEIGHT: 72 IN | TEMPERATURE: 97.4 F | HEART RATE: 94 BPM | BODY MASS INDEX: 35.08 KG/M2 | OXYGEN SATURATION: 98 % | SYSTOLIC BLOOD PRESSURE: 142 MMHG | WEIGHT: 259 LBS

## 2023-03-18 DIAGNOSIS — R10.13 EPIGASTRIC PAIN: Primary | ICD-10-CM

## 2023-03-18 DIAGNOSIS — K80.20 CHOLELITHIASIS: ICD-10-CM

## 2023-03-18 DIAGNOSIS — R11.2 NAUSEA AND VOMITING: ICD-10-CM

## 2023-03-18 LAB
2HR DELTA HS TROPONIN: 0 NG/L
ALBUMIN SERPL BCP-MCNC: 4.2 G/DL (ref 3.5–5)
ALP SERPL-CCNC: 70 U/L (ref 34–104)
ALT SERPL W P-5'-P-CCNC: 33 U/L (ref 7–52)
ANION GAP SERPL CALCULATED.3IONS-SCNC: 9 MMOL/L (ref 4–13)
AST SERPL W P-5'-P-CCNC: 22 U/L (ref 13–39)
BASOPHILS # BLD AUTO: 0.04 THOUSANDS/ÂΜL (ref 0–0.1)
BASOPHILS NFR BLD AUTO: 1 % (ref 0–1)
BILIRUB SERPL-MCNC: 0.44 MG/DL (ref 0.2–1)
BILIRUB UR QL STRIP: NEGATIVE
BUN SERPL-MCNC: 26 MG/DL (ref 5–25)
CALCIUM SERPL-MCNC: 10 MG/DL (ref 8.4–10.2)
CARDIAC TROPONIN I PNL SERPL HS: 8 NG/L
CARDIAC TROPONIN I PNL SERPL HS: 8 NG/L
CHLORIDE SERPL-SCNC: 99 MMOL/L (ref 96–108)
CLARITY UR: CLEAR
CO2 SERPL-SCNC: 27 MMOL/L (ref 21–32)
COLOR UR: YELLOW
CREAT SERPL-MCNC: 1.09 MG/DL (ref 0.6–1.3)
EOSINOPHIL # BLD AUTO: 0.09 THOUSAND/ÂΜL (ref 0–0.61)
EOSINOPHIL NFR BLD AUTO: 1 % (ref 0–6)
ERYTHROCYTE [DISTWIDTH] IN BLOOD BY AUTOMATED COUNT: 12.2 % (ref 11.6–15.1)
GFR SERPL CREATININE-BSD FRML MDRD: 75 ML/MIN/1.73SQ M
GLUCOSE SERPL-MCNC: 153 MG/DL (ref 65–140)
GLUCOSE SERPL-MCNC: 169 MG/DL (ref 65–140)
GLUCOSE UR STRIP-MCNC: NEGATIVE MG/DL
HCT VFR BLD AUTO: 50 % (ref 36.5–49.3)
HGB BLD-MCNC: 16.8 G/DL (ref 12–17)
HGB UR QL STRIP.AUTO: NEGATIVE
IMM GRANULOCYTES # BLD AUTO: 0.04 THOUSAND/UL (ref 0–0.2)
IMM GRANULOCYTES NFR BLD AUTO: 1 % (ref 0–2)
KETONES UR STRIP-MCNC: NEGATIVE MG/DL
LEUKOCYTE ESTERASE UR QL STRIP: NEGATIVE
LIPASE SERPL-CCNC: 16 U/L (ref 11–82)
LYMPHOCYTES # BLD AUTO: 1.7 THOUSANDS/ÂΜL (ref 0.6–4.47)
LYMPHOCYTES NFR BLD AUTO: 21 % (ref 14–44)
MCH RBC QN AUTO: 29.3 PG (ref 26.8–34.3)
MCHC RBC AUTO-ENTMCNC: 33.6 G/DL (ref 31.4–37.4)
MCV RBC AUTO: 87 FL (ref 82–98)
MONOCYTES # BLD AUTO: 0.52 THOUSAND/ÂΜL (ref 0.17–1.22)
MONOCYTES NFR BLD AUTO: 6 % (ref 4–12)
NEUTROPHILS # BLD AUTO: 5.85 THOUSANDS/ÂΜL (ref 1.85–7.62)
NEUTS SEG NFR BLD AUTO: 70 % (ref 43–75)
NITRITE UR QL STRIP: NEGATIVE
NRBC BLD AUTO-RTO: 0 /100 WBCS
PH UR STRIP.AUTO: 8.5 [PH]
PLATELET # BLD AUTO: 261 THOUSANDS/UL (ref 149–390)
PMV BLD AUTO: 9.4 FL (ref 8.9–12.7)
POTASSIUM SERPL-SCNC: 4.6 MMOL/L (ref 3.5–5.3)
PROT SERPL-MCNC: 7.5 G/DL (ref 6.4–8.4)
PROT UR STRIP-MCNC: NEGATIVE MG/DL
RBC # BLD AUTO: 5.74 MILLION/UL (ref 3.88–5.62)
SODIUM SERPL-SCNC: 135 MMOL/L (ref 135–147)
SP GR UR STRIP.AUTO: 1.01
UROBILINOGEN UR QL STRIP.AUTO: 0.2 E.U./DL
WBC # BLD AUTO: 8.24 THOUSAND/UL (ref 4.31–10.16)

## 2023-03-18 RX ORDER — HYDROMORPHONE HCL/PF 1 MG/ML
0.5 SYRINGE (ML) INJECTION ONCE
Status: COMPLETED | OUTPATIENT
Start: 2023-03-18 | End: 2023-03-18

## 2023-03-18 RX ORDER — ONDANSETRON 2 MG/ML
4 INJECTION INTRAMUSCULAR; INTRAVENOUS ONCE
Status: COMPLETED | OUTPATIENT
Start: 2023-03-18 | End: 2023-03-18

## 2023-03-18 RX ADMIN — SODIUM CHLORIDE 1000 ML: 0.9 INJECTION, SOLUTION INTRAVENOUS at 09:41

## 2023-03-18 RX ADMIN — HYDROMORPHONE HYDROCHLORIDE 0.5 MG: 1 INJECTION, SOLUTION INTRAMUSCULAR; INTRAVENOUS; SUBCUTANEOUS at 09:37

## 2023-03-18 RX ADMIN — IOHEXOL 100 ML: 350 INJECTION, SOLUTION INTRAVENOUS at 10:49

## 2023-03-18 RX ADMIN — ONDANSETRON 4 MG: 2 INJECTION INTRAMUSCULAR; INTRAVENOUS at 09:36

## 2023-03-18 NOTE — ED PROVIDER NOTES
History  Chief Complaint   Patient presents with   • Abdominal Pain     Epigastric pain since around 360     53-year-old male with history of diabetes, hyperlipidemia, hypertension, presents to the emergency department for evaluation of severe epigastric abdominal pain that began at 4:00 this morning  He states that last night he had a bunch of sweets which he typically does not do  He took some insulin after this  He went to bed and this morning woke up with the pain  He took his blood sugar which was 115  He states that this has happened in the past but has resolved on its own  At 1 point he states that he was on his way to the emergency department when the pain resolved  However today it has been going on for the past 4 hours so he came in to be evaluated  Does endorse some central chest pain that does not radiate without shortness of breath as well but this has resolved  He has not tried anything for the pain thus far  No history of abdominal surgeries  He does report nausea and vomiting with approximately 3 episodes of vomiting prior to arrival   Still feeling just this time  Denies any fevers or chills  Denies headache, dizziness, urinary complaints, changes in bowel movements, blood in stool, blood in vomit, chest pain, shortness of breath, focal weakness  He does state that he attempted to go to the bathroom this morning to help resolve the pain however this did not help  He describes his pain as a cramping/gassy sensation in his epigastrium  History provided by:  Patient   used: No    Abdominal Pain  Associated symptoms: nausea and vomiting    Associated symptoms: no chest pain, no chills, no cough, no dysuria, no fever, no hematuria, no shortness of breath and no sore throat        Prior to Admission Medications   Prescriptions Last Dose Informant Patient Reported? Taking?    Continuous Blood Gluc Sensor (FreeStyle Diomedes 14 Day Sensor) MISC   No No   Sig: Use and change every 2 weeks   HUMALOG KWIKPEN 100 units/mL injection pen   Yes No   Patient not taking: Reported on 5/25/2022   Insulin Aspart (NOVOLOG FLEXPEN SC)   Yes No   Sig: Inject 10 Units under the skin 3 (three) times a day with meals   Patient not taking: Reported on 5/25/2022   Insulin Degludec 100 UNIT/ML SOLN   Yes No   Sig: Inject 65 Units under the skin daily     Patient not taking: Reported on 5/25/2022   Insulin Syringe-Needle U-100 30G X 5/16" 1 ML MISC   No No   Sig: Use daily with glargine vials   amphetamine-dextroamphetamine (ADDERALL) 30 MG tablet Not Taking  Yes No   Sig: Take 30 mg by mouth 2 (two) times a day   Patient not taking: Reported on 3/18/2023   insulin glargine (Lantus) 100 units/mL subcutaneous injection   No No   Sig: Inject 60units subcut once daily at 11pm   insulin lispro (HumaLOG KwikPen) 100 units/mL injection pen   No No   Sig: Use up to 20units with meals 3x daily based on carb counting and correction   metoprolol succinate (TOPROL-XL) 100 mg 24 hr tablet   Yes No   Sig: Take 100 mg by mouth daily   rosuvastatin (CRESTOR) 5 mg tablet   Yes No   Sig: Take 5 mg by mouth daily   venlafaxine (EFFEXOR) 75 mg tablet   Yes No   Sig: Take 75 mg by mouth daily      Facility-Administered Medications: None       Past Medical History:   Diagnosis Date   • Diabetes mellitus ([facility] [address] )    • Hyperlipemia    • Hypertension        History reviewed  No pertinent surgical history  Family History   Problem Relation Age of Onset   • Heart failure Mother    • Dementia Father      I have reviewed and agree with the history as documented  E-Cigarette/Vaping   • E-Cigarette Use Never User      E-Cigarette/Vaping Substances   • Nicotine No    • THC No    • CBD No    • Flavoring No      Social History     Tobacco Use   • Smoking status: Never   • Smokeless tobacco: Current     Types: Chew   Vaping Use   • Vaping Use: Never used   Substance Use Topics   • Alcohol use:  Yes   • Drug use: Never       Review of Systems   Constitutional: Negative for chills and fever  HENT: Negative for ear pain and sore throat  Eyes: Negative for pain and visual disturbance  Respiratory: Negative for cough and shortness of breath  Cardiovascular: Negative for chest pain and palpitations  Gastrointestinal: Positive for abdominal pain, nausea and vomiting  Negative for blood in stool  Genitourinary: Negative for dysuria and hematuria  Musculoskeletal: Negative for arthralgias and back pain  Skin: Negative for color change and rash  Neurological: Negative for dizziness, seizures, syncope and headaches  Psychiatric/Behavioral: Negative for confusion  All other systems reviewed and are negative  Physical Exam  Physical Exam  Vitals and nursing note reviewed  Constitutional:       General: He is in acute distress (tearful, in distress due to pain)  Appearance: He is well-developed  He is obese  HENT:      Head: Normocephalic and atraumatic  Right Ear: External ear normal       Left Ear: External ear normal       Mouth/Throat:      Mouth: Mucous membranes are moist       Pharynx: Oropharynx is clear  Eyes:      General: No scleral icterus  Right eye: No discharge  Left eye: No discharge  Conjunctiva/sclera: Conjunctivae normal    Cardiovascular:      Rate and Rhythm: Normal rate  Pulses: Normal pulses  Heart sounds: Normal heart sounds  Pulmonary:      Effort: Pulmonary effort is normal  No respiratory distress  Breath sounds: Normal breath sounds  Chest:      Chest wall: No tenderness  Abdominal:      General: Abdomen is flat  Bowel sounds are normal  There is distension  Tenderness: There is no abdominal tenderness  There is no right CVA tenderness or left CVA tenderness  Musculoskeletal:         General: No swelling, tenderness or signs of injury  Normal range of motion  Cervical back: Normal range of motion and neck supple  No rigidity  Skin:     General: Skin is warm and dry  Capillary Refill: Capillary refill takes less than 2 seconds  Findings: No bruising, erythema or rash  Neurological:      General: No focal deficit present  Mental Status: He is alert and oriented to person, place, and time  Mental status is at baseline  Psychiatric:         Mood and Affect: Mood normal          Behavior: Behavior normal          Thought Content:  Thought content normal          Vital Signs  ED Triage Vitals [03/18/23 0854]   Temperature Pulse Respirations Blood Pressure SpO2   (!) 97 4 °F (36 3 °C) 70 18 161/98 99 %      Temp Source Heart Rate Source Patient Position - Orthostatic VS BP Location FiO2 (%)   Tympanic Monitor Sitting Left arm --      Pain Score       10 - Worst Possible Pain           Vitals:    03/18/23 0854 03/18/23 0958 03/18/23 1340   BP: 161/98 (!) 173/86 142/80   Pulse: 70 63 94   Patient Position - Orthostatic VS: Sitting Sitting Sitting         Visual Acuity      ED Medications  Medications   ondansetron (ZOFRAN) injection 4 mg (4 mg Intravenous Given 3/18/23 0936)   HYDROmorphone (DILAUDID) injection 0 5 mg (0 5 mg Intravenous Given 3/18/23 0937)   sodium chloride 0 9 % bolus 1,000 mL (0 mL Intravenous Stopped 3/18/23 1340)   iohexol (OMNIPAQUE) 350 MG/ML injection (SINGLE-DOSE) 100 mL (100 mL Intravenous Given 3/18/23 1049)       Diagnostic Studies  Results Reviewed     Procedure Component Value Units Date/Time    HS Troponin I 2hr [[id number]]  (Normal) Collected: 03/18/23 1155    Lab Status: Final result Specimen: Blood from Arm, Right Updated: 03/18/23 1224     hs TnI 2hr 8 ng/L      Delta 2hr hsTnI 0 ng/L     UA w Reflex to Microscopic w Reflex to Culture [[id number]]  (Abnormal) Collected: 03/18/23 1023    Lab Status: Final result Specimen: Urine, Clean Catch Updated: 03/18/23 1030     Color, UA Yellow     Clarity, UA Clear     Specific Gravity, UA 1 015     pH, UA 8 5     Leukocytes, UA Negative     Nitrite, UA Negative     Protein, UA Negative mg/dl      Glucose, UA Negative mg/dl      Ketones, UA Negative mg/dl      Urobilinogen, UA 0 2 E U /dl      Bilirubin, UA Negative     Occult Blood, UA Negative    HS Troponin 0hr (reflex protocol) [[id number]]  (Normal) Collected: 03/18/23 0955    Lab Status: Final result Specimen: Blood from Arm, Left Updated: 03/18/23 1024     hs TnI 0hr 8 ng/L     CBC and differential [[id number]]  (Abnormal) Collected: 03/18/23 0932    Lab Status: Final result Specimen: Blood from Arm, Left Updated: 03/18/23 0957     WBC 8 24 Thousand/uL      RBC 5 74 Million/uL      Hemoglobin 16 8 g/dL      Hematocrit 50 0 %      MCV 87 fL      MCH 29 3 pg      MCHC 33 6 g/dL      RDW 12 2 %      MPV 9 4 fL      Platelets 895 Thousands/uL      nRBC 0 /100 WBCs      Neutrophils Relative 70 %      Immat GRANS % 1 %      Lymphocytes Relative 21 %      Monocytes Relative 6 %      Eosinophils Relative 1 %      Basophils Relative 1 %      Neutrophils Absolute 5 85 Thousands/µL      Immature Grans Absolute 0 04 Thousand/uL      Lymphocytes Absolute 1 70 Thousands/µL      Monocytes Absolute 0 52 Thousand/µL      Eosinophils Absolute 0 09 Thousand/µL      Basophils Absolute 0 04 Thousands/µL     CMP [[id number]]  (Abnormal) Collected: 03/18/23 0932    Lab Status: Final result Specimen: Blood from Arm, Left Updated: 03/18/23 0954     Sodium 135 mmol/L      Potassium 4 6 mmol/L      Chloride 99 mmol/L      CO2 27 mmol/L      ANION GAP 9 mmol/L      BUN 26 mg/dL      Creatinine 1 09 mg/dL      Glucose 169 mg/dL      Calcium 10 0 mg/dL      AST 22 U/L      ALT 33 U/L      Alkaline Phosphatase 70 U/L      Total Protein 7 5 g/dL      Albumin 4 2 g/dL      Total Bilirubin 0 44 mg/dL      eGFR 75 ml/min/1 73sq m     Narrative:      [place] guidelines for Chronic Kidney Disease (CKD):   •  Stage 1 with normal or high GFR (GFR > 90 mL/min/1 73 square meters)  •  Stage 2 Mild CKD (GFR = 60-89 mL/min/1 73 square meters)  •  Stage 3A Moderate CKD (GFR = 45-59 mL/min/1 73 square meters)  •  Stage 3B Moderate CKD (GFR = 30-44 mL/min/1 73 square meters)  •  Stage 4 Severe CKD (GFR = 15-29 mL/min/1 73 square meters)  •  Stage 5 End Stage CKD (GFR <15 mL/min/1 73 square meters)  Note: GFR calculation is accurate only with a steady state creatinine    Lipase [[id number]]  (Normal) Collected: 03/18/23 0932    Lab Status: Final result Specimen: Blood from Arm, Left Updated: 03/18/23 0954     Lipase 16 u/L     Fingerstick Glucose (POCT) [[id number]]  (Abnormal) Collected: 03/18/23 0927    Lab Status: Final result Updated: 03/18/23 0936     POC Glucose 153 mg/dl                  CT Abdomen pelvis with contrast   Final Result by Deann Felipe MD (03/18 1205)      Cholelithiasis with trace pericholecystic fluid, correlate clinically for acute cholecystitis         Mild dilation of the common bile duct and intrahepatic ducts along with mild dilation of the pancreatic duct  Suggest evaluation with the MRI with MRCP for further characterization      No discrete focal pancreatic lesion seen      Borderline enlarged Small lymph nodes near the GE junction and around the left danii of diaphragm measuring about 8 mm   , Image 58 series and image 51 series 2   Attention at follow-up      Workstation performed: [id number]         XR chest 1 view portable   ED Interpretation by Rd Gomez PA-C (03/18 2189)   No acute cardiopulmonary process      Final Result by Nevaeh Hernandez MD (03/18 1330)      No acute cardiopulmonary disease                    Workstation performed: [id number]         US right upper quadrant    (Results Pending)              Procedures  ECG 12 Lead Documentation Only    Date/Time: 3/18/2023 9:38 AM  Performed by: Rd Gomez PA-C  Authorized by: dR Gomez PA-C     Indications / Diagnosis:  Nausea  ECG reviewed by me, the ED Provider: yes    Patient location: ED  Interpretation:     Interpretation: normal    Rate:     ECG rate:  70    ECG rate assessment: normal    Rhythm:     Rhythm: sinus rhythm    Ectopy:     Ectopy: none    QRS:     QRS axis:  Normal    QRS intervals:  Normal  Conduction:     Conduction: normal    ST segments:     ST segments:  Normal  T waves:     T waves: normal    ECG 12 Lead Documentation Only    Date/Time: 3/18/2023 7:34 PM  Performed by: Huang Cuadra PA-C  Authorized by: Huang Cuadra PA-C     Indications / Diagnosis:  Nausea  ECG reviewed by me, the ED Provider: yes    Patient location:  ED  Previous ECG:     Previous ECG:  Compared to current    Comparison ECG info:  Compared to ecg from 2 hours prior    Similarity:  No change  Interpretation:     Interpretation: normal    Rate:     ECG rate:  54    ECG rate assessment: normal    Rhythm:     Rhythm: sinus rhythm    Ectopy:     Ectopy: none    QRS:     QRS axis:  Normal    QRS intervals:  Normal  Conduction:     Conduction: normal    ST segments:     ST segments:  Normal  T waves:     T waves: normal               ED Course  ED Course as of 03/22/23 1302   Sat Mar 18, 2023   1026 On re-evaluation, patient much improved  Able to ambulate without difficulty   1309 TT sent to surgery regarding cholelithiasis  1324 [facility] (Deb Cardoza))  [facility]  Not a problem  Was his pain consistency from onset up until you saw him? Any history of biliary colic or did he know he had stones before today? I can come eval him in about 15 mins since I'm going to be on site for cases but likely won't need us emergently  Will look At his films before I talk with him  Thanks!  1:22 PM  20 days left   1354 Surgery at bedside  Ruq US ordered to assess further  Will determine if pt needs surgery today   1441 Patient is requesting to leave  He states that he does not want the ultrasound and wants to have it done as an outpatient   Informed surgery resident about this decision    [facility] ([address]))  [facility]  That's fine thanks for the heads up  Can just follow up with Dr Rupal Rudolph as an outpatient  2:41 PM  20 days left   Litzy form signed                               SBIRT 20yo+    [address] Most Recent Value   SBIRT (25 yo +)    In order to provide better care to our patients, we are screening all of our patients for alcohol and drug use  Would it be okay to ask you these screening questions? Yes Filed at: 03/18/2023 3747   Initial Alcohol Screen: US AUDIT-C     1  How often do you have a drink containing alcohol? 0 Filed at: 03/18/2023 0939   2  How many drinks containing alcohol do you have on a typical day you are drinking? 0 Filed at: 03/18/2023 0939   3a  Male UNDER 65: How often do you have five or more drinks on one occasion? 0 Filed at: 03/18/2023 0939   3b  FEMALE Any Age, or MALE 65+: How often do you have 4 or more drinks on one occassion? 0 Filed at: 03/18/2023 0939   Audit-C Score 0 Filed at: 03/18/2023 1358   JACI: How many times in the past year have you    Used an illegal drug or used a prescription medication for non-medical reasons? Never Filed at: 03/18/2023 6771                    Medical Decision Making  57-year-old male presents to the emergency department for evaluation of acute epigastric pain that began this morning  Patient is in acute distress due to his symptoms at the time of my evaluation  Vitals are stable  Afebrile  Patient is tearful on exam   Abdomen is soft and nontender, mildly distended  Differential: DKA, SBO, dyspepsia/GERD, peptic ulcer disease, gastritis, gastroenteritis, cholecystitis, bowel perforation  Plan: IV, blood work, CT imaging, ECG  Addressed symptoms with Dilaudid and Zofran  Started IV fluids  Results and dispo: ECG showing no ischemic changes  Blood work reassuring  Patient's symptoms remarkably improved after patient burped    CT does show evidence of cholelithiasis with trace pericholecystic fluid  RUQ ultrasound ordered for further evaluation into acute cholecystitis  Texted general surgery regarding these findings  Resident was able to come and evaluate the patient and recommended that to the right upper quadrant ultrasound to determine whether patient needs surgery more emergently  I informed the patient of the results and the concerns and he is requesting to be discharged  He is aware of the risks of leaving [address] and is aware of the signs and symptoms to watch for at home that should prompt emergent return to the emergency department  Surgery aware of patient's decision to leave AMA  I recommended that he follow-up in the office of general surgery for further evaluation and monitoring and to obtain a right upper quadrant ultrasound as an outpatient  Patient verbalizes understanding and is thankful for our care  Strict return precautions discussed  Patient left in stable condition  Cholelithiasis: undiagnosed new problem with uncertain prognosis  Epigastric pain: acute illness or injury  Nausea and vomiting: acute illness or injury  Amount and/or Complexity of Data Reviewed  Labs: ordered  Radiology: ordered and independent interpretation performed  ECG/medicine tests: ordered and independent interpretation performed  Discussion of management or test interpretation with external provider(s): Deb Johnson (general surgery)    Risk  Prescription drug management            Disposition  Final diagnoses:   Epigastric pain   Nausea and vomiting   Cholelithiasis     Time reflects when diagnosis was documented in both MDM as applicable and the Disposition within this note     Time User Action Codes Description Comment    3/18/2023  2:44 PM Fredelrick Bunting Add [R10 13] Epigastric pain     3/18/2023  2:44 PM Freddrick Bunting Add [R11 2] Nausea and vomiting     3/18/2023  2:44 PM Freddrick Bunting Add [K80 20] Cholelithiasis       ED Disposition     ED Disposition   AMA    Condition   --    Date/Time   Sat Mar 18, 2023  2:44 PM    Comment   Date: 3/18/2023  Patient: Jorge Santiago  Admitted: 3/18/2023  8:57 AM  Attending Provider: Joe Machado MD    Jorge Santiago or his authorized caregiver has made the decision for the patient to leave the emergency department against the advice of the  emergency department staff  He or his authorized caregiver has been informed and understands the inherent risks, including death, pain, suffering  He or his authorized caregiver has decided to accept the responsibility for this decision  [employer] an and all necessary parties have been advised that he may return for further evaluation or treatment  His condition at time of discharge was stable    Jorge Santiago had current vital signs as follows:  /80   Pulse 94   Temp (!) 97 4 °F (36 3  °C) (Tympanic)   Resp 17   Ht 6' (1 829 m)   Wt 117 kg (259 lb)            Follow-up Information     Follow up With Specialties Details Why Contact Info Additional Information    Imer Maddox,  General Surgery, Wound Care Schedule an appointment as soon as possible for a visit  follow up for further evaluation of symptoms [address]  [facility] [address]  [phone]       Count includes the [facility] Emergency Department Emergency Medicine Go to  If symptoms worsen [address] [place] [id number]  [facility] Emergency Department, [address] [place] [address] DrJulianna, [address] [facility]          Discharge Medication List as of 3/18/2023  2:48 PM      CONTINUE these medications which have NOT CHANGED    Details   amphetamine-dextroamphetamine (ADDERALL) 30 MG tablet Take 30 mg by mouth 2 (two) times a day, Historical Med      Continuous Blood Gluc Sensor (FreeStyle Diomedes 14 Day Sensor) MISC Use and change every 2 weeks, Normal      !! HUMALOG KWIKPEN 100 units/mL injection pen Starting Mon 12/9/2019, Historical Med      Insulin Aspart (NOVOLOG FLEXPEN SC) Inject 10 Units under the skin 3 (three) times a day with meals, Historical Med      Insulin Degludec 100 UNIT/ML SOLN Inject 65 Units under the skin daily  , Historical Med      insulin glargine (Lantus) 100 units/mL subcutaneous injection Inject 60units subcut once daily at 11pm, Normal      !! insulin lispro (HumaLOG KwikPen) 100 units/mL injection pen Use up to 20units with meals 3x daily based on carb counting and correction, Normal      Insulin Syringe-Needle U-100 30G X 5/16" 1 ML MISC Use daily with glargine vials, Normal      metoprolol succinate (TOPROL-XL) 100 mg 24 hr tablet Take 100 mg by mouth daily, Starting Sat 8/31/2019, Historical Med      rosuvastatin (CRESTOR) 5 mg tablet Take 5 mg by mouth daily, Historical Med      venlafaxine (EFFEXOR) 75 mg tablet Take 75 mg by mouth daily, Historical Med       !! - Potential duplicate medications found  Please discuss with provider  Outpatient Discharge Orders   US right upper quadrant   Standing Status: Future Standing Exp   Date: 03/18/27       PDMP Review     None          ED Provider  Electronically Signed by           Levi Aguirre PA-C  03/22/23 7195

## 2023-03-18 NOTE — CONSULTS
Consultation - General Surgery   Byron Blevins 66 y o  male MRN: [id number]  Unit/Bed#: ED 23 Encounter: [id number]    Assessment/Plan     Assessment:  66 M with Hx DM and obesity p/w acute epigastric pain and dyspepsia, cholelithiasis, questionable cholecystitis and biliary ductal dilatation discovered in the course of diagnostic work-up  VS: Hypertensive, OVSS on room air       Labs:   WBC 8 24  LFTs not abnormal     CT A/P: multiple large gallstones, one near Gb neck, trace pericholecystic fluid  GB is elongated and prominent, Bile ducts mildly dilated intra and extrahepatically  Small fat containing umbilical hernia noted, as well as one small gastrohepatic node  Plan:  -NPO [facility]  -if RUQ US concerning, will admit for laparoscpic cholecystectomy    -if RUQ US reassuring, will plan for surgical f/u w/ Dr Alan Song for elective cholecystectomy       History of Present Illness   History, ROS and PFSH unobtainable from any source due to n/a  HPI:  Byron Blevins is a 66 y o  male who presents with acute abdominal pain, which began abruptly overnight following a heavy meal  Patient states he awoke with this pain, has had similar previus episodes alleviated by belching or passing gas, and has since belched today and felt relief  He denies nausea or vomiting, denies other bowel or bladder habit changes, denies fevers and chills  He states he is not eating optimally, but that he has not recently undertaken and major dietary changes  He denies darkening of th urine, light stools, or yellowing of the skin       Consults    Review of Systems   Constitutional: Negative for activity change, chills, diaphoresis and fever  Respiratory: Negative for chest tightness and shortness of breath  Cardiovascular: Negative for palpitations  Gastrointestinal: Positive for abdominal pain  Negative for abdominal distention, nausea and vomiting  Musculoskeletal: Positive for arthralgias     All other systems reviewed and are negative  Historical Information   Past Medical History:   Diagnosis Date   • Diabetes mellitus ([facility] [address] )    • Hyperlipemia    • Hypertension      History reviewed  No pertinent surgical history  Social History   Social History     Substance and Sexual Activity   Alcohol Use Yes     Social History     Substance and Sexual Activity   Drug Use Never     E-Cigarette/Vaping   • E-Cigarette Use Never User      E-Cigarette/Vaping Substances   • Nicotine No    • THC No    • CBD No    • Flavoring No      Social History     Tobacco Use   Smoking Status Never   Smokeless Tobacco Current   • Types: Chew     Family History:   Family History   Problem Relation Age of Onset   • Heart failure Mother    • Dementia Father        Meds/Allergies   current meds:   No current facility-administered medications for this encounter  Allergies   Allergen Reactions   • Metformin Diarrhea       Objective   First Vitals:   Blood Pressure: 161/98 (03/18/23 0854)  Pulse: 70 (03/18/23 0854)  Temperature: (!) 97 4 °F (36 3 °C) (03/18/23 0854)  Temp Source: Tympanic (03/18/23 0854)  Respirations: 18 (03/18/23 0854)  Height: 6' (182 9 cm) (03/18/23 0854)  Weight - Scale: 117 kg (259 lb) (03/18/23 0854)  SpO2: 99 % (03/18/23 0854)    Current Vitals:   Blood Pressure: 142/80 (03/18/23 1340)  Pulse: 94 (03/18/23 1340)  Temperature: (!) 97 4 °F (36 3 °C) (03/18/23 0854)  Temp Source: Tympanic (03/18/23 0854)  Respirations: 17 (03/18/23 1340)  Height: 6' (182 9 cm) (03/18/23 0854)  Weight - Scale: 117 kg (259 lb) (03/18/23 0854)  SpO2: 98 % (03/18/23 1340)      Intake/Output Summary (Last 24 hours) at 3/18/2023 1409  Last data filed at 3/18/2023 1340  Gross per 24 hour   Intake 1000 ml   Output --   Net 1000 ml       Invasive Devices     Peripheral Intravenous Line  Duration           Peripheral IV 03/18/23 Distal;Left;Upper;Ventral (anterior) Arm <1 day                Physical Exam  Vitals reviewed     Constitutional: General: He is not in acute distress  Appearance: He is obese  He is not ill-appearing, toxic-appearing or diaphoretic  HENT:      Head: Normocephalic and atraumatic  Mouth/Throat:      Mouth: Mucous membranes are moist    Eyes:      General: No scleral icterus  Pupils: Pupils are equal, round, and reactive to light  Cardiovascular:      Rate and Rhythm: Normal rate and regular rhythm  Pulmonary:      Effort: Pulmonary effort is normal  No respiratory distress  Abdominal:      General: Abdomen is protuberant  There is no distension  Palpations: Abdomen is soft  Tenderness: There is no abdominal tenderness  There is no guarding or rebound  Hernia: A hernia is present  Hernia is present in the umbilical area  Musculoskeletal:         General: No swelling, tenderness or deformity  Normal range of motion  Cervical back: Normal range of motion and neck supple  No rigidity  Skin:     Capillary Refill: Capillary refill takes 2 to 3 seconds  Neurological:      General: No focal deficit present  Mental Status: He is oriented to person, place, and time  Mental status is at baseline  Psychiatric:         Mood and Affect: Mood normal          Behavior: Behavior normal          Lab Results:   I have personally reviewed pertinent lab results    , CBC:   Lab Results   Component Value Date    WBC 8 24 03/18/2023    HGB 16 8 03/18/2023    HCT 50 0 (H) 03/18/2023    MCV 87 03/18/2023     03/18/2023    MCH 29 3 03/18/2023    MCHC 33 6 03/18/2023    RDW 12 2 03/18/2023    MPV 9 4 03/18/2023    NRBC 0 03/18/2023   , CMP:   Lab Results   Component Value Date    SODIUM 135 03/18/2023    K 4 6 03/18/2023    CL 99 03/18/2023    CO2 27 03/18/2023    BUN 26 (H) 03/18/2023    CREATININE 1 09 03/18/2023    CALCIUM 10 0 03/18/2023    AST 22 03/18/2023    ALT 33 03/18/2023    ALKPHOS 70 03/18/2023    EGFR 75 03/18/2023   , Coagulation: No results found for: PT, INR, APTT, Urinalysis:   Lab Results   Component Value Date    COLORU Yellow 03/18/2023    CLARITYU Clear 03/18/2023    SPECGRAV 1 015 03/18/2023    PHUR 8 5 (A) 03/18/2023    LEUKOCYTESUR Negative 03/18/2023    NITRITE Negative 03/18/2023    GLUCOSEU Negative 03/18/2023    KETONESU Negative 03/18/2023    BILIRUBINUR Negative 03/18/2023    BLOODU Negative 03/18/2023     Imaging: I have personally reviewed pertinent reports  and I have personally reviewed pertinent films in PACS  EKG, Pathology, and Other Studies: I have personally reviewed pertinent reports  and I have personally reviewed pertinent films in PACS    XR chest 1 view portable    Result Date: 3/18/2023  Impression: No acute cardiopulmonary disease  Workstation performed: [id number]     CT Abdomen pelvis with contrast    Result Date: 3/18/2023  Impression: Cholelithiasis with trace pericholecystic fluid, correlate clinically for acute cholecystitis Mild dilation of the common bile duct and intrahepatic ducts along with mild dilation of the pancreatic duct    Suggest evaluation with the MRI with MRCP for further characterization No discrete focal pancreatic lesion seen Borderline enlarged Small lymph nodes near the GE junction and around the left danii of diaphragm measuring about 8 mm , Image 58 series and image 51 series 2 Attention at follow-up Workstation performed: [id number]

## 2023-03-18 NOTE — DISCHARGE INSTRUCTIONS
Please follow-up with Dr Rupal Rudolph in general surgery for further evaluation of your concerns  Before you schedule an appointment to get an ultrasound done at an outpatient radiology facility  If you develop any new, concerning, worsening symptoms, such as severe pain in the right side of your abdomen or epigastric pain with intractable nausea and vomiting, please return to the emergency department for reevaluation

## 2023-03-19 LAB
ATRIAL RATE: 178 BPM
ATRIAL RATE: 54 BPM
P AXIS: 44 DEGREES
P AXIS: 62 DEGREES
PR INTERVAL: 130 MS
QRS AXIS: 39 DEGREES
QRS AXIS: 48 DEGREES
QRSD INTERVAL: 78 MS
QRSD INTERVAL: 78 MS
QT INTERVAL: 400 MS
QT INTERVAL: 476 MS
QTC INTERVAL: 432 MS
QTC INTERVAL: 451 MS
T WAVE AXIS: 80 DEGREES
T WAVE AXIS: 86 DEGREES
VENTRICULAR RATE: 54 BPM
VENTRICULAR RATE: 70 BPM

## 2024-04-02 ENCOUNTER — OFFICE VISIT (OUTPATIENT)
Dept: URGENT CARE | Facility: CLINIC | Age: 59
End: 2024-04-02
Payer: COMMERCIAL

## 2024-04-02 VITALS
BODY MASS INDEX: 35.41 KG/M2 | HEART RATE: 99 BPM | TEMPERATURE: 98.1 F | HEIGHT: 72 IN | OXYGEN SATURATION: 98 % | RESPIRATION RATE: 18 BRPM | SYSTOLIC BLOOD PRESSURE: 141 MMHG | WEIGHT: 261.4 LBS | DIASTOLIC BLOOD PRESSURE: 89 MMHG

## 2024-04-02 DIAGNOSIS — M65.332 TRIGGER FINGER, LEFT MIDDLE FINGER: Primary | ICD-10-CM

## 2024-04-02 PROCEDURE — 99213 OFFICE O/P EST LOW 20 MIN: CPT | Performed by: PHYSICIAN ASSISTANT

## 2024-04-02 RX ORDER — NAPROXEN 500 MG/1
500 TABLET ORAL 2 TIMES DAILY WITH MEALS
Qty: 30 TABLET | Refills: 0 | Status: SHIPPED | OUTPATIENT
Start: 2024-04-02

## 2024-04-02 NOTE — PROGRESS NOTES
[facility]        NAME: Felxi Smith is a 59 y.o. male  : 1965    MRN: [id number]  DATE: 2024  TIME: 12:43 PM    Assessment and Plan   Trigger finger, left middle finger [M65.332]  1. Trigger finger, left middle finger  Ambulatory Referral to Orthopedic Surgery    naproxen (Naprosyn) 500 mg tablet            Patient Instructions       Follow up with PCP in 3-5 days.  Proceed to  ER if symptoms worsen.    If tests have been performed at [facility], our office will contact you with results if changes need to be made to the care plan discussed with you at the visit.  You can review your full results on [facility].    Chief Complaint     Chief Complaint   Patient presents with    Hand Pain     Left hand, middle finger injury, 6 months         History of Present Illness       Patient is a 60 y/o/m presenting to [facility] with left middle finger/hand pain.  Patient denies any known injury to digit.  Patient has looked up trigger finger and has been doing homeopathic remedies without improvement the past month.  Patient reports finger would get stuck and would manually need to straighten.  Patient denies numbness or tingling.          Review of Systems   Review of Systems   Constitutional:  Negative for chills and fever.   HENT:  Negative for ear pain and sore throat.    Eyes:  Negative for pain and visual disturbance.   Respiratory:  Negative for cough and shortness of breath.    Cardiovascular:  Negative for chest pain and palpitations.   Gastrointestinal:  Negative for abdominal pain and vomiting.   Genitourinary:  Negative for dysuria and hematuria.   Musculoskeletal:  Positive for arthralgias (Left middle finger pain and clicking). Negative for back pain.   Skin:  Negative for color change and rash.   Neurological:  Negative for seizures and syncope.   All other systems reviewed and are negative.        Current Medications       Current Outpatient Medications:     Continuous Blood  "Gluc Sensor (FreeStyle Diomedes 14 Day Sensor) MISC, Use and change every 2 weeks, Disp: 1 each, Rfl: 5    insulin glargine (Lantus) 100 units/mL subcutaneous injection, Inject 60units subcut once daily at 11pm, Disp: 20 mL, Rfl: 4    insulin lispro (HumaLOG KwikPen) 100 units/mL injection pen, Use up to 20units with meals 3x daily based on carb counting and correction, Disp: 30 mL, Rfl: 4    Insulin Syringe-Needle U-100 30G X 5/16\" 1 ML MISC, Use daily with glargine vials, Disp: 90 each, Rfl: 1    metoprolol succinate (TOPROL-XL) 100 mg 24 hr tablet, Take 100 mg by mouth daily, Disp: , Rfl: 3    naproxen (Naprosyn) 500 mg tablet, Take 1 tablet (500 mg total) by mouth 2 (two) times a day with meals, Disp: 30 tablet, Rfl: 0    rosuvastatin (CRESTOR) 5 mg tablet, Take 5 mg by mouth daily, Disp: , Rfl:     venlafaxine (EFFEXOR) 75 mg tablet, Take 75 mg by mouth daily, Disp: , Rfl:     amphetamine-dextroamphetamine (ADDERALL) 30 MG tablet, Take 30 mg by mouth 2 (two) times a day (Patient not taking: Reported on 3/18/2023), Disp: , Rfl:     HUMALOG KWIKPEN 100 units/mL injection pen, , Disp: , Rfl:     Insulin Aspart (NOVOLOG FLEXPEN SC), Inject 10 Units under the skin 3 (three) times a day with meals (Patient not taking: Reported on 5/25/2022), Disp: , Rfl:     Insulin Degludec 100 UNIT/ML SOLN, Inject 65 Units under the skin daily   (Patient not taking: Reported on 5/25/2022), Disp: , Rfl:     Current Allergies     Allergies as of 04/02/2024 - Reviewed 04/02/2024   Allergen Reaction Noted    Metformin Diarrhea 11/22/2021            The following portions of the patient's history were reviewed and updated as appropriate: allergies, current medications, past family history, past medical history, past social history, past surgical history and problem list.     Past Medical History:   Diagnosis Date    Diabetes mellitus (HCC)     Hyperlipemia     Hypertension        History reviewed. No pertinent surgical history.    Family " History   Problem Relation Age of Onset    Heart failure Mother     Dementia Father          Medications have been verified.        Objective   /89   Pulse 99   Temp 98.1 °F (36.7 °C)   Resp 18   Ht 6' (1.829 m)   Wt 119 kg (261 lb 6.4 oz)   SpO2 98%   BMI 35.45 kg/m²   No LMP for male patient.       Physical Exam     Physical Exam  Constitutional:       Appearance: Normal appearance. He is normal weight.   HENT:      Head: Normocephalic and atraumatic.      Nose: Nose normal.      Mouth/Throat:      Mouth: Mucous membranes are moist.   Eyes:      Extraocular Movements: Extraocular movements intact.      Conjunctiva/sclera: Conjunctivae normal.      Pupils: Pupils are equal, round, and reactive to light.   Cardiovascular:      Rate and Rhythm: Normal rate.   Pulmonary:      Effort: Pulmonary effort is normal.   Musculoskeletal:         General: Normal range of motion.        Hands:       Cervical back: Normal range of motion and neck supple.   Skin:     General: Skin is warm and dry.   Neurological:      General: No focal deficit present.      Mental Status: He is alert and oriented to person, place, and time.   Psychiatric:         Mood and Affect: Mood normal.         Behavior: Behavior normal.

## 2024-04-03 ENCOUNTER — OFFICE VISIT (OUTPATIENT)
Dept: OBGYN CLINIC | Facility: CLINIC | Age: 59
End: 2024-04-03
Payer: COMMERCIAL

## 2024-04-03 ENCOUNTER — APPOINTMENT (OUTPATIENT)
Dept: LAB | Facility: CLINIC | Age: 59
End: 2024-04-03
Payer: COMMERCIAL

## 2024-04-03 ENCOUNTER — APPOINTMENT (OUTPATIENT)
Dept: RADIOLOGY | Facility: CLINIC | Age: 59
End: 2024-04-03
Payer: COMMERCIAL

## 2024-04-03 VITALS
BODY MASS INDEX: 35.21 KG/M2 | DIASTOLIC BLOOD PRESSURE: 87 MMHG | SYSTOLIC BLOOD PRESSURE: 132 MMHG | HEIGHT: 72 IN | WEIGHT: 260 LBS | HEART RATE: 96 BPM

## 2024-04-03 DIAGNOSIS — E11.65 TYPE 2 DIABETES MELLITUS WITH HYPERGLYCEMIA, WITHOUT LONG-TERM CURRENT USE OF INSULIN (HCC): ICD-10-CM

## 2024-04-03 DIAGNOSIS — M65.332 TRIGGER FINGER, LEFT MIDDLE FINGER: Primary | ICD-10-CM

## 2024-04-03 DIAGNOSIS — M79.642 PAIN OF LEFT HAND: ICD-10-CM

## 2024-04-03 LAB
EST. AVERAGE GLUCOSE BLD GHB EST-MCNC: 163 MG/DL
HBA1C MFR BLD: 7.3 %

## 2024-04-03 PROCEDURE — 83036 HEMOGLOBIN GLYCOSYLATED A1C: CPT

## 2024-04-03 PROCEDURE — 99204 OFFICE O/P NEW MOD 45 MIN: CPT | Performed by: STUDENT IN AN ORGANIZED HEALTH CARE EDUCATION/TRAINING PROGRAM

## 2024-04-03 PROCEDURE — 73130 X-RAY EXAM OF HAND: CPT

## 2024-04-03 PROCEDURE — 36415 COLL VENOUS BLD VENIPUNCTURE: CPT

## 2024-04-03 NOTE — PROGRESS NOTES
Ortho Sports Medicine New Patient Elbow Visit     Assesment:   59 y.o.male with left long finger pain and triggering over the A1 pulley for several months with exam consistent with a left long finger trigger finger    Plan:  I discussed the history, exam, and imaging with the patient in clinic today.  I did review the patient's x-rays that were negative for fracture or other osseous abnormalities.  On exam, the patient does have significant tenderness and a palpable nodule at the A1 pulley.  He does endorse a history of catching of the left long finger when he tried to extend it from a flexed position.  Based on this, I diagnosed him with a left long finger trigger finger.  I discussed potential treatment options focusing on conservative management.  I discussed that we could try a corticosteroid injection given that he has not had an injection for this in the past.  I also discussed surgical intervention.  The patient opted for an injection at this point.  He did note that he has an elevated A1c of greater than 8 at his last visit.  For, since then he has been taking Ozempic and states that his sugars have been much better controlled.  I discussed that given the elevated A1c I would hold off on a corticosteroid injection today and send him for repeat A1c.  I discussed that if the A1c is less than 7.5 we can do the injection at his earliest convenience.  Patient was in agreement with this plan.  All of his questions were answered.  We placed an order for an A1c.  He can follow-up after the A1c is complete for corticosteroid injection if the A1c is less than 7.5.  Patient can reach out to clinic with any questions or concerns anytime.    Conservative treatment:  Updated A1C ordered.  Consideration for cortisone injection pending on lab results.    Imaging:  All imaging from today was reviewed by myself and explained to the patient.     Injection:  No Injection planned at this time.  May consider future corticosteroid  "injection depending on clinical exam/labs.     Surgery:  No surgery is recommended at this point, continue with conservative treatment plan as noted.    Follow up:  Return if symptoms worsen or fail to improve.        Chief Complaint   Patient presents with    Left Hand - Clicking, Locking, Pain       History of Present Illness:  The patient is a 59 y.o. RHD male seen in clinic for left hand pain. The patient states that several months ago, he experienced triggering of his left middle finger. He states that the symptoms progressively worsened over time, leading to locking of his left middle finger. He states that he attempted a home exercise program, including massaging and range of motion exercises, which helped intermittently. However, the locking occurred more frequently. He reports pain with the triggering and locking. The pain is located in the base of his finger. He rates his pain as a 4/10. He denies numbness and tingling.    Occupation: [employer]    The patient has the following co-morbidities: Diabetes       Hand/wrist Surgical History:  None    Past Medical, Social and Family History:  Past Medical History:   Diagnosis Date    Diabetes mellitus (HCC)     Hyperlipemia     Hypertension      History reviewed. No pertinent surgical history.  Allergies   Allergen Reactions    Metformin Diarrhea     Current Outpatient Medications on File Prior to Visit   Medication Sig Dispense Refill    insulin glargine (Lantus) 100 units/mL subcutaneous injection Inject 60units subcut once daily at 11pm 20 mL 4    insulin lispro (HumaLOG KwikPen) 100 units/mL injection pen Use up to 20units with meals 3x daily based on carb counting and correction 30 mL 4    Insulin Syringe-Needle U-100 30G X 5/16\" 1 ML MISC Use daily with glargine vials 90 each 1    metoprolol succinate (TOPROL-XL) 100 mg 24 hr tablet Take 100 mg by mouth daily  3    naproxen (Naprosyn) 500 mg tablet Take 1 tablet (500 mg total) by mouth 2 (two) times a " day with meals 30 tablet 0    rosuvastatin (CRESTOR) 5 mg tablet Take 5 mg by mouth daily      venlafaxine (EFFEXOR) 75 mg tablet Take 75 mg by mouth daily      amphetamine-dextroamphetamine (ADDERALL) 30 MG tablet Take 30 mg by mouth 2 (two) times a day (Patient not taking: Reported on 3/18/2023)      Continuous Blood Gluc Sensor (FreeStyle Diomedes 14 Day Sensor) MISC Use and change every 2 weeks 1 each 5    HUMALOG KWIKPEN 100 units/mL injection pen  (Patient not taking: Reported on 5/25/2022)      Insulin Aspart (NOVOLOG FLEXPEN SC) Inject 10 Units under the skin 3 (three) times a day with meals (Patient not taking: Reported on 5/25/2022)      Insulin Degludec 100 UNIT/ML SOLN Inject 65 Units under the skin daily   (Patient not taking: Reported on 5/25/2022)       No current facility-administered medications on file prior to visit.     Social History     Socioeconomic History    Marital status: Single     Spouse name: Not on file    Number of children: Not on file    Years of education: Not on file    Highest education level: Not on file   Occupational History    Not on file   Tobacco Use    Smoking status: Never    Smokeless tobacco: Current     Types: Chew   Vaping Use    Vaping status: Never Used   Substance and Sexual Activity    Alcohol use: Yes    Drug use: Never    Sexual activity: Not on file     Comment: defer   Other Topics Concern    Not on file   Social History Narrative    Not on file     Social Determinants of Health     Financial Resource Strain: Not on file   Food Insecurity: Not on file   Transportation Needs: Not on file   Physical Activity: Not on file   Stress: Not on file   Social Connections: Not on file   Intimate Partner Violence: Not on file   Housing Stability: Not on file         I have reviewed the past medical, surgical, social and family history, medications and allergies as documented in the EMR.    Review of systems: ROS is negative other than that noted in the HPI.  Constitutional:  Negative for fatigue and fever.   HENT: Negative for sore throat.    Respiratory: Negative for shortness of breath.    Cardiovascular: Negative for chest pain.   Gastrointestinal: Negative for abdominal pain.   Endocrine: Negative for cold intolerance and heat intolerance.   Genitourinary: Negative for flank pain.   Musculoskeletal: Negative for back pain.   Skin: Negative for rash.   Allergic/Immunologic: Negative for immunocompromised state.   Neurological: Negative for dizziness.   Psychiatric/Behavioral: Negative for agitation.     Physical Exam:    Blood pressure 132/87, pulse 96, height 6' (1.829 m), weight 118 kg (260 lb).    General/Constitutional: NAD, well developed, well nourished  HENT: Normocephalic, atraumatic  CV: Intact distal pulses, regular rate  Resp: No respiratory distress or labored breathing  Neuro: Alert and Oriented x 3  Psych: Normal mood, normal affect, normal judgement, normal behavior  Skin: Warm, dry, no rashes, no erythema    left Hand -  middle finger -   Patient presents with no obvious anatomical deformity  Skin is warm and dry to touch with no signs of erythema, ecchymosis, or infection  No soft tissue swelling or effusion noted  Full FDS, FDP, extensor mechanisms are intact  No rotational deformity with composite finger flexion  TTP with palpable nodule in the middle finger flexor tendon local to A1 Pulley  Reproducible triggering on exam  Demonstrates normal wrist, elbow, and shoulder motion  Forearm compartments are soft and supple    UE NV Exam: +2 Radial pulses bilaterally  Sensation intact to light touch C5-T1 bilaterally, Radial/median/ulnar nerve motor intact    Bilateral shoulder, wrist/hand, and forearm ROM full, painless with passive ROM, no ttp or crepitance throughout extremities above wrist joint    Cervical ROM is full without pain, numbness or tingling    Negative spurling maneuver bilaterally       Hand Imaging:    X-rays of the left hand were obtained 4/3/2024 and  reviewed with the patient.  Based on my independent review, imaging shows no acute osseous abnormalities.        Scribe Attestation      I,:  Frederick Noel am acting as a scribe while in the presence of the attending physician.:       I,:  Raymundo Weinstein MD personally performed the services described in this documentation    as scribed in my presence.:

## 2024-04-08 ENCOUNTER — OFFICE VISIT (OUTPATIENT)
Dept: OBGYN CLINIC | Facility: CLINIC | Age: 59
End: 2024-04-08
Payer: COMMERCIAL

## 2024-04-08 ENCOUNTER — TELEPHONE (OUTPATIENT)
Age: 59
End: 2024-04-08

## 2024-04-08 VITALS
DIASTOLIC BLOOD PRESSURE: 80 MMHG | WEIGHT: 261 LBS | HEART RATE: 80 BPM | HEIGHT: 72 IN | BODY MASS INDEX: 35.35 KG/M2 | SYSTOLIC BLOOD PRESSURE: 130 MMHG

## 2024-04-08 DIAGNOSIS — M79.642 PAIN OF LEFT HAND: ICD-10-CM

## 2024-04-08 DIAGNOSIS — M65.332 TRIGGER FINGER, LEFT MIDDLE FINGER: Primary | ICD-10-CM

## 2024-04-08 PROCEDURE — 99213 OFFICE O/P EST LOW 20 MIN: CPT | Performed by: STUDENT IN AN ORGANIZED HEALTH CARE EDUCATION/TRAINING PROGRAM

## 2024-04-08 RX ADMIN — METHYLPREDNISOLONE ACETATE 1 ML: 40 INJECTION, SUSPENSION INTRA-ARTICULAR; INTRALESIONAL; INTRAMUSCULAR; SOFT TISSUE at 13:15

## 2024-04-08 RX ADMIN — LIDOCAINE HYDROCHLORIDE 1 ML: 10 INJECTION, SOLUTION INFILTRATION; PERINEURAL at 13:15

## 2024-04-08 NOTE — PROGRESS NOTES
Ortho Sports Medicine New Patient Elbow Visit     Assesment:   59 y.o.male with left long finger pain and triggering over the A1 pulley for several months with exam consistent with a left long finger trigger finger    Plan:  I discussed the history and exam with the patient including today.  His symptoms have remained send the same since last visit.  His A1c is now 7 3 and he is able to get a corticosteroid injection.  I did review the risks of the injection including infection, flare reaction, and increase in blood glucose.  I did encourage him to monitor his blood glucose over the next few days to make sure it returns to his baseline.  Patient demonstrated understanding discussion was amenable to the injection.  We did perform an injection centered over the left long finger A1 pulley.  Patient tolerated the procedure well with no adverse reaction was distally neurovascular tact afterwards.  Did state that his symptoms proved significant after the injection.  I recommended the patient follow-up in 6 weeks for repeat evaluation.  I did discuss that if the symptoms continue we could refer him to hand surgery for trigger release.  Patient demonstrated understanding discussion and agreed with plan.  All his questions answered.  He reach out to clinic with any questions or concerns anytime.    Conservative treatment:  Pain medications as needed  Ice to affected area.    Imaging:  All imaging from today was reviewed by myself and explained to the patient.     Injection:  Small joint arthrocentesis: L long MCP  Universal Protocol:  Consent: Verbal consent obtained.  Consent given by: patient  Patient understanding: patient states understanding of the procedure being performed  Site marked: the operative site was marked  Patient identity confirmed: verbally with patient  Supporting Documentation  Indications: pain   Procedure Details  Location: long finger - L long MCP (trigger injection)  Needle size: 25 G  Ultrasound  guidance: no  Approach: volar  Medications administered: 1 mL lidocaine 1 %; 1 mL methylPREDNISolone acetate 40 mg/mL        Surgery:  No surgery is recommended at this point, continue with conservative treatment plan as noted.    Follow up:  Return in about 6 weeks (around 5/20/2024).        Chief Complaint   Patient presents with    Left Hand - Pain, Clicking, Locking       History of Present Illness:  The patient is a 59 y.o. RHD male he is following up in clinic for left hand pain.  At the patient's previous visit, he was diagnosed with left long trigger finger.  We did discuss her treatment options and the patient opted for corticosteroid injection.  However, he mentioned that he had an elevated A1c so we opted to get a repeat hemoglobin A1c prior to the injection.  Patient had his repeat hemoglobin A1c which was 7.3.  He is now returning for the injection.    Prior history:  The patient states that several months ago, he experienced triggering of his left middle finger. He states that the symptoms progressively worsened over time, leading to locking of his left middle finger. He states that he attempted a home exercise program, including massaging and range of motion exercises, which helped intermittently. However, the locking occurred more frequently. He reports pain with the triggering and locking. The pain is located in the base of his finger. He rates his pain as a 4/10. He denies numbness and tingling.    Occupation: [employer]    The patient has the following co-morbidities: Diabetes       Hand/wrist Surgical History:  None    Past Medical, Social and Family History:  Past Medical History:   Diagnosis Date    Diabetes mellitus (HCC)     Hyperlipemia     Hypertension      History reviewed. No pertinent surgical history.  Allergies   Allergen Reactions    Metformin Diarrhea     Current Outpatient Medications on File Prior to Visit   Medication Sig Dispense Refill    insulin glargine (Lantus) 100  "units/mL subcutaneous injection Inject 60units subcut once daily at 11pm 20 mL 4    insulin lispro (HumaLOG KwikPen) 100 units/mL injection pen Use up to 20units with meals 3x daily based on carb counting and correction 30 mL 4    Insulin Syringe-Needle U-100 30G X 5/16\" 1 ML MISC Use daily with glargine vials 90 each 1    metoprolol succinate (TOPROL-XL) 100 mg 24 hr tablet Take 100 mg by mouth daily  3    naproxen (Naprosyn) 500 mg tablet Take 1 tablet (500 mg total) by mouth 2 (two) times a day with meals 30 tablet 0    rosuvastatin (CRESTOR) 5 mg tablet Take 5 mg by mouth daily      venlafaxine (EFFEXOR) 75 mg tablet Take 75 mg by mouth daily      amphetamine-dextroamphetamine (ADDERALL) 30 MG tablet Take 30 mg by mouth 2 (two) times a day (Patient not taking: Reported on 3/18/2023)      Continuous Blood Gluc Sensor (Bazariyle Diomedes 14 Day Sensor) MISC Use and change every 2 weeks 1 each 5    HUMALOG KWIKPEN 100 units/mL injection pen  (Patient not taking: Reported on 5/25/2022)      Insulin Aspart (NOVOLOG FLEXPEN SC) Inject 10 Units under the skin 3 (three) times a day with meals (Patient not taking: Reported on 5/25/2022)      Insulin Degludec 100 UNIT/ML SOLN Inject 65 Units under the skin daily   (Patient not taking: Reported on 5/25/2022)       No current facility-administered medications on file prior to visit.     Social History     Socioeconomic History    Marital status: Single     Spouse name: Not on file    Number of children: Not on file    Years of education: Not on file    Highest education level: Not on file   Occupational History    Not on file   Tobacco Use    Smoking status: Never    Smokeless tobacco: Current     Types: Chew   Vaping Use    Vaping status: Never Used   Substance and Sexual Activity    Alcohol use: Yes    Drug use: Never    Sexual activity: Not on file     Comment: defer   Other Topics Concern    Not on file   Social History Narrative    Not on file     Social Determinants of " Health     Financial Resource Strain: Not on file   Food Insecurity: Not on file   Transportation Needs: Not on file   Physical Activity: Not on file   Stress: Not on file   Social Connections: Not on file   Intimate Partner Violence: Not on file   Housing Stability: Not on file         I have reviewed the past medical, surgical, social and family history, medications and allergies as documented in the EMR.    Review of systems: ROS is negative other than that noted in the HPI.  Constitutional: Negative for fatigue and fever.   HENT: Negative for sore throat.    Respiratory: Negative for shortness of breath.    Cardiovascular: Negative for chest pain.   Gastrointestinal: Negative for abdominal pain.   Endocrine: Negative for cold intolerance and heat intolerance.   Genitourinary: Negative for flank pain.   Musculoskeletal: Negative for back pain.   Skin: Negative for rash.   Allergic/Immunologic: Negative for immunocompromised state.   Neurological: Negative for dizziness.   Psychiatric/Behavioral: Negative for agitation.     Physical Exam:    Blood pressure 130/80, pulse 80, height 6' (1.829 m), weight 118 kg (261 lb).    General/Constitutional: NAD, well developed, well nourished  HENT: Normocephalic, atraumatic  CV: Intact distal pulses, regular rate  Resp: No respiratory distress or labored breathing  Neuro: Alert and Oriented x 3  Psych: Normal mood, normal affect, normal judgement, normal behavior  Skin: Warm, dry, no rashes, no erythema    left Hand -  middle finger -   Patient presents with no obvious anatomical deformity  Skin is warm and dry to touch with no signs of erythema, ecchymosis, or infection  No soft tissue swelling or effusion noted  Full FDS, FDP, extensor mechanisms are intact  No rotational deformity with composite finger flexion  TTP with palpable nodule in the middle finger flexor tendon local to A1 Pulley  Reproducible triggering on exam  Demonstrates normal wrist, elbow, and shoulder  motion  Forearm compartments are soft and supple    UE NV Exam: +2 Radial pulses bilaterally  Sensation intact to light touch C5-T1 bilaterally, Radial/median/ulnar nerve motor intact    Bilateral shoulder, wrist/hand, and forearm ROM full, painless with passive ROM, no ttp or crepitance throughout extremities above wrist joint    Cervical ROM is full without pain, numbness or tingling    Negative spurling maneuver bilaterally       Hand Imaging:    X-rays of the left hand were obtained 4/3/2024 and reviewed with the patient.  Based on my independent review, imaging shows no acute osseous abnormalities.        Scribe Attestation      I,:   am acting as a scribe while in the presence of the attending physician.:       I,:   personally performed the services described in this documentation    as scribed in my presence.:

## 2024-04-10 RX ORDER — LIDOCAINE HYDROCHLORIDE 10 MG/ML
1 INJECTION, SOLUTION INFILTRATION; PERINEURAL
Status: COMPLETED | OUTPATIENT
Start: 2024-04-08 | End: 2024-04-08

## 2024-04-10 RX ORDER — METHYLPREDNISOLONE ACETATE 40 MG/ML
1 INJECTION, SUSPENSION INTRA-ARTICULAR; INTRALESIONAL; INTRAMUSCULAR; SOFT TISSUE
Status: COMPLETED | OUTPATIENT
Start: 2024-04-08 | End: 2024-04-08

## 2024-09-19 ENCOUNTER — OFFICE VISIT (OUTPATIENT)
Dept: OBGYN CLINIC | Facility: CLINIC | Age: 59
End: 2024-09-19
Payer: COMMERCIAL

## 2024-09-19 VITALS
SYSTOLIC BLOOD PRESSURE: 127 MMHG | HEART RATE: 102 BPM | DIASTOLIC BLOOD PRESSURE: 83 MMHG | HEIGHT: 72 IN | WEIGHT: 252 LBS | BODY MASS INDEX: 34.13 KG/M2

## 2024-09-19 DIAGNOSIS — M65.332 TRIGGER FINGER, LEFT MIDDLE FINGER: Primary | ICD-10-CM

## 2024-09-19 PROCEDURE — 20600 DRAIN/INJ JOINT/BURSA W/O US: CPT | Performed by: STUDENT IN AN ORGANIZED HEALTH CARE EDUCATION/TRAINING PROGRAM

## 2024-09-19 PROCEDURE — 99213 OFFICE O/P EST LOW 20 MIN: CPT | Performed by: STUDENT IN AN ORGANIZED HEALTH CARE EDUCATION/TRAINING PROGRAM

## 2024-09-19 RX ADMIN — LIDOCAINE HYDROCHLORIDE 1 ML: 10 INJECTION, SOLUTION INFILTRATION; PERINEURAL at 16:00

## 2024-09-19 RX ADMIN — METHYLPREDNISOLONE ACETATE 1 ML: 40 INJECTION, SUSPENSION INTRA-ARTICULAR; INTRALESIONAL; INTRAMUSCULAR; SOFT TISSUE at 16:00

## 2024-09-19 NOTE — PROGRESS NOTES
Ortho Sports Medicine New Patient Elbow Visit     Assesment:   59 y.o.male with left long finger pain and triggering over the A1 pulley for several months with exam consistent with a left long finger trigger finger    Plan:  I discussed the history and exam with the patient including today.  The patient states that his previous corticosteroid injection helped significant with symptoms but they recently returned.  He is interested in another corticosteroid injection.  I discussed that typically a second corticosteroid injection is appropriate but if he continues to have symptoms the neck step would likely be surgery.  Patient was amenable to this plan. I did review the risks of the injection including infection, flare reaction, and increase in blood glucose.  I did encourage him to monitor his blood glucose over the next few days to make sure it returns to his baseline.  Patient demonstrated understanding discussion was amenable to the injection.  We did perform an injection centered over the left long finger A1 pulley.  Patient tolerated the procedure well with no adverse reaction was distally neurovascular tact afterwards.  He stated that his symptoms improved significant after the injection.  The patient can follow up on an as needed basis.  I did discuss that if the symptoms continue we could refer him to hand surgery for trigger release.  Patient demonstrated understanding discussion and agreed with plan.  All his questions answered.  He reach out to clinic with any questions or concerns anytime.    Conservative treatment:  Pain medications as needed  Ice to affected area.    Imaging:  All imaging from today was reviewed by myself and explained to the patient.     Injection:  Small joint arthrocentesis: L long MCP  Universal Protocol:  Consent: Verbal consent obtained.  Consent given by: patient  Patient understanding: patient states understanding of the procedure being performed  Site marked: the operative site  was marked  Patient identity confirmed: verbally with patient  Supporting Documentation  Indications: pain   Procedure Details  Location: long finger - L long MCP (trigger injection)  Needle size: 25 G  Ultrasound guidance: no  Approach: volar  Medications administered: 1 mL lidocaine 1 %; 1 mL methylPREDNISolone acetate 40 mg/mL        Surgery:  No surgery is recommended at this point, continue with conservative treatment plan as noted.    Follow up:  Return if symptoms worsen or fail to improve.        Chief Complaint   Patient presents with    Left Hand - Follow-up     Left middle finger         History of Present Illness:  The patient is a 59 y.o. RHD male he is following up in clinic for left long finger pain and triggering.  Patient's previous visit, he was provided with a corticosteroid injection for left long trigger finger.  He states that the injection provided significant pain relief until recently when he did notice that the long finger is starting to get stuck when in full flexion.  He is interested in a repeat corticosteroid injection today.  His most recent A1c was 7.3.    Prior history:  The patient states that several months ago, he experienced triggering of his left middle finger. He states that the symptoms progressively worsened over time, leading to locking of his left middle finger. He states that he attempted a home exercise program, including massaging and range of motion exercises, which helped intermittently. However, the locking occurred more frequently. He reports pain with the triggering and locking. The pain is located in the base of his finger. He rates his pain as a 4/10. He denies numbness and tingling.    Occupation: [employer]    The patient has the following co-morbidities: Diabetes       Hand/wrist Surgical History:  None    Past Medical, Social and Family History:  Past Medical History:   Diagnosis Date    Diabetes mellitus (HCC)     Hyperlipemia     Hypertension      Past  "Surgical History:   Procedure Laterality Date    DENTAL IMPLANT      TONSILLECTOMY N/A      Allergies   Allergen Reactions    Metformin Diarrhea     Current Outpatient Medications on File Prior to Visit   Medication Sig Dispense Refill    insulin glargine (Lantus) 100 units/mL subcutaneous injection Inject 60units subcut once daily at 11pm 20 mL 4    insulin lispro (HumaLOG KwikPen) 100 units/mL injection pen Use up to 20units with meals 3x daily based on carb counting and correction 30 mL 4    Insulin Syringe-Needle U-100 30G X 5/16\" 1 ML MISC Use daily with glargine vials 90 each 1    metoprolol succinate (TOPROL-XL) 100 mg 24 hr tablet Take 100 mg by mouth daily  3    rosuvastatin (CRESTOR) 5 mg tablet Take 5 mg by mouth daily      venlafaxine (EFFEXOR) 75 mg tablet Take 75 mg by mouth daily      amphetamine-dextroamphetamine (ADDERALL) 30 MG tablet Take 30 mg by mouth 2 (two) times a day (Patient not taking: Reported on 3/18/2023)      Continuous Blood Gluc Sensor (FreeStyle Diomedes 14 Day Sensor) MISC Use and change every 2 weeks 1 each 5    HUMALOG KWIKPEN 100 units/mL injection pen  (Patient not taking: Reported on 5/25/2022)      Insulin Aspart (NOVOLOG FLEXPEN SC) Inject 10 Units under the skin 3 (three) times a day with meals (Patient not taking: Reported on 5/25/2022)      Insulin Degludec 100 UNIT/ML SOLN Inject 65 Units under the skin daily   (Patient not taking: Reported on 5/25/2022)      naproxen (Naprosyn) 500 mg tablet Take 1 tablet (500 mg total) by mouth 2 (two) times a day with meals (Patient not taking: Reported on 9/19/2024) 30 tablet 0     No current facility-administered medications on file prior to visit.     Social History     Socioeconomic History    Marital status: Single     Spouse name: Not on file    Number of children: Not on file    Years of education: Not on file    Highest education level: Not on file   Occupational History    Not on file   Tobacco Use    Smoking status: Never    " Smokeless tobacco: Current     Types: Chew   Vaping Use    Vaping status: Never Used   Substance and Sexual Activity    Alcohol use: Yes    Drug use: Never    Sexual activity: Not on file     Comment: defer   Other Topics Concern    Not on file   Social History Narrative    Not on file     Social Determinants of Health     Financial Resource Strain: Not on file   Food Insecurity: Not on file   Transportation Needs: Not on file   Physical Activity: Not on file   Stress: Not on file   Social Connections: Not on file   Intimate Partner Violence: Not on file   Housing Stability: Not on file         I have reviewed the past medical, surgical, social and family history, medications and allergies as documented in the EMR.    Review of systems: ROS is negative other than that noted in the HPI.  Constitutional: Negative for fatigue and fever.   HENT: Negative for sore throat.    Respiratory: Negative for shortness of breath.    Cardiovascular: Negative for chest pain.   Gastrointestinal: Negative for abdominal pain.   Endocrine: Negative for cold intolerance and heat intolerance.   Genitourinary: Negative for flank pain.   Musculoskeletal: Negative for back pain.   Skin: Negative for rash.   Allergic/Immunologic: Negative for immunocompromised state.   Neurological: Negative for dizziness.   Psychiatric/Behavioral: Negative for agitation.     Physical Exam:    Blood pressure 127/83, pulse 102, height 6' (1.829 m), weight 114 kg (252 lb).    General/Constitutional: NAD, well developed, well nourished  HENT: Normocephalic, atraumatic  CV: Intact distal pulses, regular rate  Resp: No respiratory distress or labored breathing  Neuro: Alert and Oriented x 3  Psych: Normal mood, normal affect, normal judgement, normal behavior  Skin: Warm, dry, no rashes, no erythema    left Hand -  middle finger -   Patient presents with no obvious anatomical deformity  Skin is warm and dry to touch with no signs of erythema, ecchymosis, or  infection  No soft tissue swelling or effusion noted  Full FDS, FDP, extensor mechanisms are intact  No rotational deformity with composite finger flexion  TTP with palpable nodule in the long finger flexor tendon local to A1 Pulley  Reproducible triggering on exam with full flexion  Demonstrates normal wrist, elbow, and shoulder motion  Forearm compartments are soft and supple    UE NV Exam: +2 Radial pulses bilaterally  Sensation intact to light touch C5-T1 bilaterally, Radial/median/ulnar nerve motor intact    Bilateral shoulder, wrist/hand, and forearm ROM full, painless with passive ROM, no ttp or crepitance throughout extremities above wrist joint    Cervical ROM is full without pain, numbness or tingling    Negative spurling maneuver bilaterally       Hand Imaging:    X-rays of the left hand were obtained 4/3/2024 and reviewed with the patient.  Based on my independent review, imaging shows no acute osseous abnormalities.        Scribe Attestation      I,:   am acting as a scribe while in the presence of the attending physician.:       I,:   personally performed the services described in this documentation    as scribed in my presence.:

## 2024-09-23 RX ORDER — METHYLPREDNISOLONE ACETATE 40 MG/ML
1 INJECTION, SUSPENSION INTRA-ARTICULAR; INTRALESIONAL; INTRAMUSCULAR; SOFT TISSUE
Status: COMPLETED | OUTPATIENT
Start: 2024-09-19 | End: 2024-09-19

## 2024-09-23 RX ORDER — LIDOCAINE HYDROCHLORIDE 10 MG/ML
1 INJECTION, SOLUTION INFILTRATION; PERINEURAL
Status: COMPLETED | OUTPATIENT
Start: 2024-09-19 | End: 2024-09-19

## 2025-01-13 ENCOUNTER — TELEPHONE (OUTPATIENT)
Age: 60
End: 2025-01-13

## 2025-01-15 VITALS — WEIGHT: 252 LBS | BODY MASS INDEX: 34.13 KG/M2 | HEIGHT: 72 IN

## 2025-01-15 DIAGNOSIS — M65.332 TRIGGER FINGER, LEFT MIDDLE FINGER: Primary | ICD-10-CM

## 2025-01-15 PROCEDURE — 20600 DRAIN/INJ JOINT/BURSA W/O US: CPT

## 2025-01-15 PROCEDURE — 99213 OFFICE O/P EST LOW 20 MIN: CPT | Performed by: STUDENT IN AN ORGANIZED HEALTH CARE EDUCATION/TRAINING PROGRAM

## 2025-01-15 RX ORDER — METHYLPREDNISOLONE ACETATE 40 MG/ML
1 INJECTION, SUSPENSION INTRA-ARTICULAR; INTRALESIONAL; INTRAMUSCULAR; SOFT TISSUE
Status: COMPLETED | OUTPATIENT
Start: 2025-01-15 | End: 2025-01-15

## 2025-01-15 RX ORDER — LIDOCAINE HYDROCHLORIDE 10 MG/ML
1 INJECTION, SOLUTION INFILTRATION; PERINEURAL
Status: COMPLETED | OUTPATIENT
Start: 2025-01-15 | End: 2025-01-15

## 2025-01-15 RX ADMIN — METHYLPREDNISOLONE ACETATE 1 ML: 40 INJECTION, SUSPENSION INTRA-ARTICULAR; INTRALESIONAL; INTRAMUSCULAR; SOFT TISSUE at 15:30

## 2025-01-15 RX ADMIN — LIDOCAINE HYDROCHLORIDE 1 ML: 10 INJECTION, SOLUTION INFILTRATION; PERINEURAL at 15:30

## 2025-01-15 NOTE — PROGRESS NOTES
Ortho Sports Medicine New Patient Elbow Visit     Assesment:   60 y.o.male with left long finger pain and triggering over the A1 pulley for several months with exam consistent with a left long finger trigger finger    Plan:  I reviewed the history and exam with the patient clued today.  The patient continues to have triggering of the left long finger.  He was planning to follow-up with Dr. Neil for further management of his trigger finger.  However, he was accidentally scheduled for this clinic.  Patient did state that his previous injection did help significantly until recently.  He states that he is interested in another injection today.  I did discuss with the patient that if he has an injection that he may limit his surgical options for several weeks to minimize any risk of infection.  The patient acknowledged this and was still interested in injection today.  I did perform an injection into the left long finger to address his triggering.  Patient tolerated procedure well with no adverse reaction.  We did place another referral to Dr. Neil in order for the patient to follow-up with him for possible surgical intervention given the persistent symptoms.  He can follow-up on a as needed basis. The patient demonstrated understanding of the discussion and was in agreement with the plan.  All of the questions were answered.  Patient can reach out to clinic with any questions or concerns at any time.        Injection:  Small joint arthrocentesis: L long MCP  Universal Protocol:  Consent: Verbal consent obtained.  Consent given by: patient  Patient understanding: patient states understanding of the procedure being performed  Site marked: the operative site was marked  Patient identity confirmed: verbally with patient  Supporting Documentation  Indications: pain   Procedure Details  Location: long finger - L long MCP (trigger injection)  Needle size: 25 G  Ultrasound guidance: no  Approach: volar  Medications  administered: 1 mL lidocaine 1 %; 1 mL methylPREDNISolone acetate 40 mg/mL    Patient tolerance: patient tolerated the procedure well with no immediate complications  Dressing:  Sterile dressing applied            Follow up:  Return if symptoms worsen or fail to improve.        Chief Complaint   Patient presents with    Left Hand - Clicking, Pain, Locking       History of Present Illness:  The patient is a 60 y.o. RHD male he is following up in clinic for left long finger pain and triggering. Patient states he was under the impression that he was going to see Dr Neil today in [place], but realized he was scheduled in [place] after receiving a text message reminder notification. Patient received a corticosteroid injection for left long trigger finger.  He states that the prior injection provided significant pain relief until recently when he did notice that the long finger is starting to get stuck when in full flexion.  He is interested in a repeat corticosteroid injection today.  His most recent A1c was 7.3.    Prior history:  The patient states that several months ago, he experienced triggering of his left middle finger. He states that the symptoms progressively worsened over time, leading to locking of his left middle finger. He states that he attempted a home exercise program, including massaging and range of motion exercises, which helped intermittently. However, the locking occurred more frequently. He reports pain with the triggering and locking. The pain is located in the base of his finger. He rates his pain as a 4/10. He denies numbness and tingling.    Occupation: [employer]    The patient has the following co-morbidities: Diabetes       Hand/wrist Surgical History:  None    Past Medical, Social and Family History:  Past Medical History:   Diagnosis Date    Diabetes mellitus (HCC)     Hyperlipemia     Hypertension      Past Surgical History:   Procedure Laterality Date    DENTAL IMPLANT       "TONSILLECTOMY N/A      Allergies   Allergen Reactions    Metformin Diarrhea     Current Outpatient Medications on File Prior to Visit   Medication Sig Dispense Refill    insulin glargine (Lantus) 100 units/mL subcutaneous injection Inject 60units subcut once daily at 11pm 20 mL 4    insulin lispro (HumaLOG KwikPen) 100 units/mL injection pen Use up to 20units with meals 3x daily based on carb counting and correction 30 mL 4    Insulin Syringe-Needle U-100 30G X 5/16\" 1 ML MISC Use daily with glargine vials 90 each 1    metoprolol succinate (TOPROL-XL) 100 mg 24 hr tablet Take 100 mg by mouth daily  3    rosuvastatin (CRESTOR) 5 mg tablet Take 5 mg by mouth daily      venlafaxine (EFFEXOR) 75 mg tablet Take 75 mg by mouth daily      amphetamine-dextroamphetamine (ADDERALL) 30 MG tablet Take 30 mg by mouth 2 (two) times a day (Patient not taking: Reported on 1/15/2025)      Continuous Blood Gluc Sensor (FreeStyle Diomedes 14 Day Sensor) MISC Use and change every 2 weeks 1 each 5    HUMALOG KWIKPEN 100 units/mL injection pen  (Patient not taking: Reported on 1/15/2025)      Insulin Aspart (NOVOLOG FLEXPEN SC) Inject 10 Units under the skin 3 (three) times a day with meals (Patient not taking: Reported on 1/15/2025)      Insulin Degludec 100 UNIT/ML SOLN Inject 65 Units under the skin daily   (Patient not taking: Reported on 1/15/2025)      naproxen (Naprosyn) 500 mg tablet Take 1 tablet (500 mg total) by mouth 2 (two) times a day with meals (Patient not taking: Reported on 1/15/2025) 30 tablet 0     No current facility-administered medications on file prior to visit.     Social History     Socioeconomic History    Marital status: Single     Spouse name: Not on file    Number of children: Not on file    Years of education: Not on file    Highest education level: Not on file   Occupational History    Not on file   Tobacco Use    Smoking status: Never    Smokeless tobacco: Current     Types: Chew   Vaping Use    Vaping " status: Never Used   Substance and Sexual Activity    Alcohol use: Yes    Drug use: Never    Sexual activity: Not on file     Comment: defer   Other Topics Concern    Not on file   Social History Narrative    Not on file     Social Drivers of Health     Financial Resource Strain: Not on file   Food Insecurity: Not on file   Transportation Needs: Not on file   Physical Activity: Not on file   Stress: Not on file   Social Connections: Not on file   Intimate Partner Violence: Not on file   Housing Stability: Not on file         I have reviewed the past medical, surgical, social and family history, medications and allergies as documented in the EMR.    Review of systems: ROS is negative other than that noted in the HPI.  Constitutional: Negative for fatigue and fever.   HENT: Negative for sore throat.    Respiratory: Negative for shortness of breath.    Cardiovascular: Negative for chest pain.   Gastrointestinal: Negative for abdominal pain.   Endocrine: Negative for cold intolerance and heat intolerance.   Genitourinary: Negative for flank pain.   Musculoskeletal: Negative for back pain.   Skin: Negative for rash.   Allergic/Immunologic: Negative for immunocompromised state.   Neurological: Negative for dizziness.   Psychiatric/Behavioral: Negative for agitation.     Physical Exam:    Height 6' (1.829 m), weight 114 kg (252 lb).    General/Constitutional: NAD, well developed, well nourished  HENT: Normocephalic, atraumatic  CV: Intact distal pulses, regular rate  Resp: No respiratory distress or labored breathing  Neuro: Alert and Oriented x 3  Psych: Normal mood, normal affect, normal judgement, normal behavior  Skin: Warm, dry, no rashes, no erythema    left Hand -  middle finger -   Patient presents with no obvious anatomical deformity  Skin is warm and dry to touch with no signs of erythema, ecchymosis, or infection  No soft tissue swelling or effusion noted  Full FDS, FDP, extensor mechanisms are intact  No  rotational deformity with composite finger flexion  TTP with palpable nodule in the long finger flexor tendon local to A1 Pulley  Reproducible triggering on exam with full flexion  Demonstrates normal wrist, elbow, and shoulder motion  Forearm compartments are soft and supple    UE NV Exam: +2 Radial pulses bilaterally  Sensation intact to light touch C5-T1 bilaterally, Radial/median/ulnar nerve motor intact    Bilateral shoulder, wrist/hand, and forearm ROM full, painless with passive ROM, no ttp or crepitance throughout extremities above wrist joint    Cervical ROM is full without pain, numbness or tingling    Negative spurling maneuver bilaterally       Hand Imaging:    X-rays of the left hand were obtained 4/3/2024 and reviewed with the patient.  Based on my independent review, imaging shows no acute osseous abnormalities.        Scribe Attestation      I,:  Yon Alvarez PA-C am acting as a scribe while in the presence of the attending physician.:       I,:  Raymundo Weinsteni MD personally performed the services described in this documentation    as scribed in my presence.:

## 2025-01-15 NOTE — PROGRESS NOTES
Ortho Sports Medicine New Patient Elbow Visit     Assesment:   60 y.o.male with left long finger pain and triggering over the A1 pulley for several months with exam consistent with a left long finger trigger finger    Plan:  I discussed the history and exam with the patient including today.  The patient states that his previous corticosteroid injection helped significant with symptoms but they recently returned.  He is interested in another corticosteroid injection.  I discussed that typically a second corticosteroid injection is appropriate but if he continues to have symptoms the neck step would likely be surgery.  Patient was amenable to this plan. I did review the risks of the injection including infection, flare reaction, and increase in blood glucose.  I did encourage him to monitor his blood glucose over the next few days to make sure it returns to his baseline.  Patient demonstrated understanding discussion was amenable to the injection.  We did perform an injection centered over the left long finger A1 pulley.  Patient tolerated the procedure well with no adverse reaction was distally neurovascular tact afterwards.  He stated that his symptoms improved significant after the injection.  The patient can follow up on an as needed basis.  I did discuss that if the symptoms continue we could refer him to hand surgery for trigger release.  Patient demonstrated understanding discussion and agreed with plan.  All his questions answered.  He reach out to clinic with any questions or concerns anytime.    Conservative treatment:  Pain medications as needed  Ice to affected area.    Imaging:  All imaging from today was reviewed by myself and explained to the patient.     Injection:  ProceduresSurgery:  No surgery is recommended at this point, continue with conservative treatment plan as noted.    Follow up:  No follow-ups on file.        Chief Complaint   Patient presents with    Left Hand - Clicking, Pain, Locking  "      History of Present Illness:  The patient is a 60 y.o. RHD male he is following up in clinic for left long finger pain and triggering.  Patient's previous visit, he was provided with a corticosteroid injection for left long trigger finger.  He states that the injection provided significant pain relief until recently when he did notice that the long finger is starting to get stuck when in full flexion.  He is interested in a repeat corticosteroid injection today.  His most recent A1c was 7.3.    Prior history:  The patient states that several months ago, he experienced triggering of his left middle finger. He states that the symptoms progressively worsened over time, leading to locking of his left middle finger. He states that he attempted a home exercise program, including massaging and range of motion exercises, which helped intermittently. However, the locking occurred more frequently. He reports pain with the triggering and locking. The pain is located in the base of his finger. He rates his pain as a 4/10. He denies numbness and tingling.    Occupation: [employer]    The patient has the following co-morbidities: Diabetes       Hand/wrist Surgical History:  None    Past Medical, Social and Family History:  Past Medical History:   Diagnosis Date    Diabetes mellitus (HCC)     Hyperlipemia     Hypertension      Past Surgical History:   Procedure Laterality Date    DENTAL IMPLANT      TONSILLECTOMY N/A      Allergies   Allergen Reactions    Metformin Diarrhea     Current Outpatient Medications on File Prior to Visit   Medication Sig Dispense Refill    insulin glargine (Lantus) 100 units/mL subcutaneous injection Inject 60units subcut once daily at 11pm 20 mL 4    insulin lispro (HumaLOG KwikPen) 100 units/mL injection pen Use up to 20units with meals 3x daily based on carb counting and correction 30 mL 4    Insulin Syringe-Needle U-100 30G X 5/16\" 1 ML MISC Use daily with glargine vials 90 each 1    " metoprolol succinate (TOPROL-XL) 100 mg 24 hr tablet Take 100 mg by mouth daily  3    rosuvastatin (CRESTOR) 5 mg tablet Take 5 mg by mouth daily      venlafaxine (EFFEXOR) 75 mg tablet Take 75 mg by mouth daily      amphetamine-dextroamphetamine (ADDERALL) 30 MG tablet Take 30 mg by mouth 2 (two) times a day (Patient not taking: Reported on 1/15/2025)      Continuous Blood Gluc Sensor ([employer]iStyle Diomedes 14 Day Sensor) MISC Use and change every 2 weeks 1 each 5    HUMALOG KWIKPEN 100 units/mL injection pen  (Patient not taking: Reported on 1/15/2025)      Insulin Aspart (NOVOLOG FLEXPEN SC) Inject 10 Units under the skin 3 (three) times a day with meals (Patient not taking: Reported on 1/15/2025)      Insulin Degludec 100 UNIT/ML SOLN Inject 65 Units under the skin daily   (Patient not taking: Reported on 1/15/2025)      naproxen (Naprosyn) 500 mg tablet Take 1 tablet (500 mg total) by mouth 2 (two) times a day with meals (Patient not taking: Reported on 1/15/2025) 30 tablet 0     No current facility-administered medications on file prior to visit.     Social History     Socioeconomic History    Marital status: Single     Spouse name: Not on file    Number of children: Not on file    Years of education: Not on file    Highest education level: Not on file   Occupational History    Not on file   Tobacco Use    Smoking status: Never    Smokeless tobacco: Current     Types: Chew   Vaping Use    Vaping status: Never Used   Substance and Sexual Activity    Alcohol use: Yes    Drug use: Never    Sexual activity: Not on file     Comment: defer   Other Topics Concern    Not on file   Social History Narrative    Not on file     Social Drivers of Health     Financial Resource Strain: Not on file   Food Insecurity: Not on file   Transportation Needs: Not on file   Physical Activity: Not on file   Stress: Not on file   Social Connections: Not on file   Intimate Partner Violence: Not on file   Housing Stability: Not on file          I have reviewed the past medical, surgical, social and family history, medications and allergies as documented in the EMR.    Review of systems: ROS is negative other than that noted in the HPI.  Constitutional: Negative for fatigue and fever.   HENT: Negative for sore throat.    Respiratory: Negative for shortness of breath.    Cardiovascular: Negative for chest pain.   Gastrointestinal: Negative for abdominal pain.   Endocrine: Negative for cold intolerance and heat intolerance.   Genitourinary: Negative for flank pain.   Musculoskeletal: Negative for back pain.   Skin: Negative for rash.   Allergic/Immunologic: Negative for immunocompromised state.   Neurological: Negative for dizziness.   Psychiatric/Behavioral: Negative for agitation.     Physical Exam:    Height 6' (1.829 m), weight 114 kg (252 lb).    General/Constitutional: NAD, well developed, well nourished  HENT: Normocephalic, atraumatic  CV: Intact distal pulses, regular rate  Resp: No respiratory distress or labored breathing  Neuro: Alert and Oriented x 3  Psych: Normal mood, normal affect, normal judgement, normal behavior  Skin: Warm, dry, no rashes, no erythema    left Hand -  middle finger -   Patient presents with no obvious anatomical deformity  Skin is warm and dry to touch with no signs of erythema, ecchymosis, or infection  No soft tissue swelling or effusion noted  Full FDS, FDP, extensor mechanisms are intact  No rotational deformity with composite finger flexion  TTP with palpable nodule in the long finger flexor tendon local to A1 Pulley  Reproducible triggering on exam with full flexion  Demonstrates normal wrist, elbow, and shoulder motion  Forearm compartments are soft and supple    UE NV Exam: +2 Radial pulses bilaterally  Sensation intact to light touch C5-T1 bilaterally, Radial/median/ulnar nerve motor intact    Bilateral shoulder, wrist/hand, and forearm ROM full, painless with passive ROM, no ttp or crepitance throughout  extremities above wrist joint    Cervical ROM is full without pain, numbness or tingling    Negative spurling maneuver bilaterally       Hand Imaging:    X-rays of the left hand were obtained 4/3/2024 and reviewed with the patient.  Based on my independent review, imaging shows no acute osseous abnormalities.        Scribe Attestation      I,:   am acting as a scribe while in the presence of the attending physician.:       I,:   personally performed the services described in this documentation    as scribed in my presence.: